# Patient Record
Sex: FEMALE | Race: WHITE | Employment: OTHER | URBAN - METROPOLITAN AREA
[De-identification: names, ages, dates, MRNs, and addresses within clinical notes are randomized per-mention and may not be internally consistent; named-entity substitution may affect disease eponyms.]

---

## 2017-04-30 ENCOUNTER — COMMUNICATION - HEALTHEAST (OUTPATIENT)
Dept: FAMILY MEDICINE | Facility: CLINIC | Age: 32
End: 2017-04-30

## 2017-04-30 DIAGNOSIS — Z30.09 BIRTH CONTROL COUNSELING: ICD-10-CM

## 2019-11-21 ENCOUNTER — RECORDS - HEALTHEAST (OUTPATIENT)
Dept: LAB | Facility: CLINIC | Age: 34
End: 2019-11-21

## 2019-11-21 LAB
ALBUMIN UR-MCNC: NEGATIVE MG/DL
APPEARANCE UR: ABNORMAL
BACTERIA #/AREA URNS HPF: ABNORMAL HPF
BASOPHILS # BLD AUTO: 0 THOU/UL (ref 0–0.2)
BASOPHILS NFR BLD AUTO: 1 % (ref 0–2)
BILIRUB UR QL STRIP: NEGATIVE
COLOR UR AUTO: YELLOW
EOSINOPHIL # BLD AUTO: 0 THOU/UL (ref 0–0.4)
EOSINOPHIL NFR BLD AUTO: 1 % (ref 0–6)
ERYTHROCYTE [DISTWIDTH] IN BLOOD BY AUTOMATED COUNT: 12.7 % (ref 11–14.5)
GLUCOSE UR STRIP-MCNC: NEGATIVE MG/DL
HCT VFR BLD AUTO: 38.9 % (ref 35–47)
HGB BLD-MCNC: 13.1 G/DL (ref 12–16)
HGB UR QL STRIP: NEGATIVE
HIV 1+2 AB+HIV1 P24 AG SERPL QL IA: NEGATIVE
KETONES UR STRIP-MCNC: NEGATIVE MG/DL
LEUKOCYTE ESTERASE UR QL STRIP: NEGATIVE
LYMPHOCYTES # BLD AUTO: 1.7 THOU/UL (ref 0.8–4.4)
LYMPHOCYTES NFR BLD AUTO: 26 % (ref 20–40)
MCH RBC QN AUTO: 30.1 PG (ref 27–34)
MCHC RBC AUTO-ENTMCNC: 33.7 G/DL (ref 32–36)
MCV RBC AUTO: 89 FL (ref 80–100)
MONOCYTES # BLD AUTO: 0.4 THOU/UL (ref 0–0.9)
MONOCYTES NFR BLD AUTO: 6 % (ref 2–10)
MUCOUS THREADS #/AREA URNS LPF: ABNORMAL LPF
NEUTROPHILS # BLD AUTO: 4.3 THOU/UL (ref 2–7.7)
NEUTROPHILS NFR BLD AUTO: 66 % (ref 50–70)
NITRATE UR QL: NEGATIVE
PH UR STRIP: 6 [PH] (ref 4.5–8)
PLATELET # BLD AUTO: 233 THOU/UL (ref 140–440)
PMV BLD AUTO: 10.5 FL (ref 8.5–12.5)
RBC # BLD AUTO: 4.35 MILL/UL (ref 3.8–5.4)
RBC #/AREA URNS AUTO: ABNORMAL HPF
SP GR UR STRIP: 1.01 (ref 1–1.03)
SQUAMOUS #/AREA URNS AUTO: ABNORMAL LPF
TRANS CELLS #/AREA URNS HPF: ABNORMAL LPF
UROBILINOGEN UR STRIP-ACNC: ABNORMAL
WBC #/AREA URNS AUTO: ABNORMAL HPF
WBC: 6.5 THOU/UL (ref 4–11)

## 2019-11-22 LAB
25(OH)D3 SERPL-MCNC: 31.3 NG/ML (ref 30–80)
ABO/RH(D): NORMAL
ABORH REPEAT: NORMAL
ANTIBODY SCREEN: NEGATIVE
BACTERIA SPEC CULT: NO GROWTH
C TRACH DNA SPEC QL PROBE+SIG AMP: NEGATIVE
HBA1C MFR BLD: 5.5 % (ref 4.2–6.1)
HBV SURFACE AG SERPL QL IA: NEGATIVE
HCV AB SERPL QL IA: NEGATIVE
N GONORRHOEA DNA SPEC QL NAA+PROBE: NEGATIVE
RUBV IGG SERPL QL IA: POSITIVE
T PALLIDUM AB SER QL: NEGATIVE

## 2020-04-07 ENCOUNTER — RECORDS - HEALTHEAST (OUTPATIENT)
Dept: LAB | Facility: CLINIC | Age: 35
End: 2020-04-07

## 2020-04-07 LAB
BASOPHILS # BLD AUTO: 0 THOU/UL (ref 0–0.2)
BASOPHILS NFR BLD AUTO: 0 % (ref 0–2)
EOSINOPHIL # BLD AUTO: 0.1 THOU/UL (ref 0–0.4)
EOSINOPHIL NFR BLD AUTO: 1 % (ref 0–6)
ERYTHROCYTE [DISTWIDTH] IN BLOOD BY AUTOMATED COUNT: 13.2 % (ref 11–14.5)
FASTING STATUS PATIENT QL REPORTED: NORMAL
GLUCOSE 1H P 50 G GLC PO SERPL-MCNC: 80 MG/DL (ref 70–139)
HCT VFR BLD AUTO: 38.2 % (ref 35–47)
HGB BLD-MCNC: 12.5 G/DL (ref 12–16)
LYMPHOCYTES # BLD AUTO: 1.8 THOU/UL (ref 0.8–4.4)
LYMPHOCYTES NFR BLD AUTO: 19 % (ref 20–40)
MCH RBC QN AUTO: 30.8 PG (ref 27–34)
MCHC RBC AUTO-ENTMCNC: 32.7 G/DL (ref 32–36)
MCV RBC AUTO: 94 FL (ref 80–100)
MONOCYTES # BLD AUTO: 0.5 THOU/UL (ref 0–0.9)
MONOCYTES NFR BLD AUTO: 5 % (ref 2–10)
NEUTROPHILS # BLD AUTO: 7.3 THOU/UL (ref 2–7.7)
NEUTROPHILS NFR BLD AUTO: 75 % (ref 50–70)
PLATELET # BLD AUTO: 198 THOU/UL (ref 140–440)
PMV BLD AUTO: 11.3 FL (ref 8.5–12.5)
RBC # BLD AUTO: 4.06 MILL/UL (ref 3.8–5.4)
WBC: 9.8 THOU/UL (ref 4–11)

## 2020-04-08 LAB
ANTIBODY SCREEN: NEGATIVE
T PALLIDUM AB SER QL: NEGATIVE

## 2020-05-11 ENCOUNTER — HOSPITAL ENCOUNTER (INPATIENT)
Facility: CLINIC | Age: 35
LOS: 18 days | Discharge: LEFT BEFORE DISCHARGE | End: 2020-05-29
Attending: ADVANCED PRACTICE MIDWIFE | Admitting: OBSTETRICS & GYNECOLOGY
Payer: COMMERCIAL

## 2020-05-11 DIAGNOSIS — A60.00 HERPES SIMPLEX INFECTION OF GENITOURINARY SYSTEM: Primary | ICD-10-CM

## 2020-05-11 PROBLEM — Z36.89 ENCOUNTER FOR TRIAGE IN PREGNANT PATIENT: Status: ACTIVE | Noted: 2020-05-11

## 2020-05-11 PROBLEM — O42.919 PRETERM PREMATURE RUPTURE OF MEMBRANES (PPROM) WITH UNKNOWN ONSET OF LABOR: Status: ACTIVE | Noted: 2020-05-11

## 2020-05-11 LAB
ABO + RH BLD: NORMAL
ABO + RH BLD: NORMAL
ALBUMIN UR-MCNC: NEGATIVE MG/DL
AMPHETAMINES UR QL SCN: NEGATIVE
APPEARANCE UR: CLEAR
BASOPHILS # BLD AUTO: 0 10E9/L (ref 0–0.2)
BASOPHILS NFR BLD AUTO: 0.1 %
BILIRUB UR QL STRIP: NEGATIVE
BLD GP AB SCN SERPL QL: NORMAL
BLOOD BANK CMNT PATIENT-IMP: NORMAL
CANNABINOIDS UR QL: NEGATIVE
COCAINE UR QL: NEGATIVE
COLOR UR AUTO: ABNORMAL
DIFFERENTIAL METHOD BLD: NORMAL
EOSINOPHIL # BLD AUTO: 0 10E9/L (ref 0–0.7)
EOSINOPHIL NFR BLD AUTO: 0.3 %
ERYTHROCYTE [DISTWIDTH] IN BLOOD BY AUTOMATED COUNT: 12.8 % (ref 10–15)
GLUCOSE UR STRIP-MCNC: 300 MG/DL
HCT VFR BLD AUTO: 35.9 % (ref 35–47)
HGB BLD-MCNC: 12.2 G/DL (ref 11.7–15.7)
HGB UR QL STRIP: NEGATIVE
IMM GRANULOCYTES # BLD: 0 10E9/L (ref 0–0.4)
IMM GRANULOCYTES NFR BLD: 0.2 %
KETONES UR STRIP-MCNC: 10 MG/DL
LEUKOCYTE ESTERASE UR QL STRIP: NEGATIVE
LYMPHOCYTES # BLD AUTO: 1.8 10E9/L (ref 0.8–5.3)
LYMPHOCYTES NFR BLD AUTO: 17.1 %
MCH RBC QN AUTO: 30.9 PG (ref 26.5–33)
MCHC RBC AUTO-ENTMCNC: 34 G/DL (ref 31.5–36.5)
MCV RBC AUTO: 91 FL (ref 78–100)
MONOCYTES # BLD AUTO: 0.7 10E9/L (ref 0–1.3)
MONOCYTES NFR BLD AUTO: 6.4 %
NEUTROPHILS # BLD AUTO: 8 10E9/L (ref 1.6–8.3)
NEUTROPHILS NFR BLD AUTO: 75.9 %
NITRATE UR QL: NEGATIVE
NRBC # BLD AUTO: 0 10*3/UL
NRBC BLD AUTO-RTO: 0 /100
OPIATES UR QL SCN: NEGATIVE
PCP UR QL SCN: NEGATIVE
PH UR STRIP: 5.5 PH (ref 5–7)
PLATELET # BLD AUTO: 185 10E9/L (ref 150–450)
RBC # BLD AUTO: 3.95 10E12/L (ref 3.8–5.2)
RUPTURE OF FETAL MEMBRANES BY ROM PLUS: POSITIVE
SARS-COV-2 PCR COMMENT: NORMAL
SARS-COV-2 RNA SPEC QL NAA+PROBE: NEGATIVE
SARS-COV-2 RNA SPEC QL NAA+PROBE: NORMAL
SOURCE: ABNORMAL
SP GR UR STRIP: 1.01 (ref 1–1.03)
SPECIMEN EXP DATE BLD: NORMAL
SPECIMEN SOURCE: NORMAL
UROBILINOGEN UR STRIP-MCNC: NORMAL MG/DL (ref 0–2)
WBC # BLD AUTO: 10.5 10E9/L (ref 4–11)
WET PREP SPEC: NORMAL

## 2020-05-11 PROCEDURE — 59025 FETAL NON-STRESS TEST: CPT

## 2020-05-11 PROCEDURE — 86850 RBC ANTIBODY SCREEN: CPT | Performed by: STUDENT IN AN ORGANIZED HEALTH CARE EDUCATION/TRAINING PROGRAM

## 2020-05-11 PROCEDURE — 87086 URINE CULTURE/COLONY COUNT: CPT | Performed by: STUDENT IN AN ORGANIZED HEALTH CARE EDUCATION/TRAINING PROGRAM

## 2020-05-11 PROCEDURE — 86780 TREPONEMA PALLIDUM: CPT | Performed by: STUDENT IN AN ORGANIZED HEALTH CARE EDUCATION/TRAINING PROGRAM

## 2020-05-11 PROCEDURE — 25000132 ZZH RX MED GY IP 250 OP 250 PS 637: Performed by: STUDENT IN AN ORGANIZED HEALTH CARE EDUCATION/TRAINING PROGRAM

## 2020-05-11 PROCEDURE — 86901 BLOOD TYPING SEROLOGIC RH(D): CPT | Performed by: STUDENT IN AN ORGANIZED HEALTH CARE EDUCATION/TRAINING PROGRAM

## 2020-05-11 PROCEDURE — 12000001 ZZH R&B MED SURG/OB UMMC

## 2020-05-11 PROCEDURE — 86900 BLOOD TYPING SEROLOGIC ABO: CPT | Performed by: STUDENT IN AN ORGANIZED HEALTH CARE EDUCATION/TRAINING PROGRAM

## 2020-05-11 PROCEDURE — G0463 HOSPITAL OUTPT CLINIC VISIT: HCPCS | Mod: 25

## 2020-05-11 PROCEDURE — 87635 SARS-COV-2 COVID-19 AMP PRB: CPT | Performed by: STUDENT IN AN ORGANIZED HEALTH CARE EDUCATION/TRAINING PROGRAM

## 2020-05-11 PROCEDURE — 80307 DRUG TEST PRSMV CHEM ANLYZR: CPT | Performed by: STUDENT IN AN ORGANIZED HEALTH CARE EDUCATION/TRAINING PROGRAM

## 2020-05-11 PROCEDURE — 99207 ZZC CONSULT E&M CHANGED TO SUBSEQUENT LEVEL: CPT | Performed by: NURSE PRACTITIONER

## 2020-05-11 PROCEDURE — 87491 CHLMYD TRACH DNA AMP PROBE: CPT | Performed by: STUDENT IN AN ORGANIZED HEALTH CARE EDUCATION/TRAINING PROGRAM

## 2020-05-11 PROCEDURE — 87210 SMEAR WET MOUNT SALINE/INK: CPT | Performed by: STUDENT IN AN ORGANIZED HEALTH CARE EDUCATION/TRAINING PROGRAM

## 2020-05-11 PROCEDURE — 84112 EVAL AMNIOTIC FLUID PROTEIN: CPT | Performed by: ADVANCED PRACTICE MIDWIFE

## 2020-05-11 PROCEDURE — 85025 COMPLETE CBC W/AUTO DIFF WBC: CPT | Performed by: STUDENT IN AN ORGANIZED HEALTH CARE EDUCATION/TRAINING PROGRAM

## 2020-05-11 PROCEDURE — 99231 SBSQ HOSP IP/OBS SF/LOW 25: CPT | Performed by: NURSE PRACTITIONER

## 2020-05-11 PROCEDURE — 81003 URINALYSIS AUTO W/O SCOPE: CPT | Performed by: STUDENT IN AN ORGANIZED HEALTH CARE EDUCATION/TRAINING PROGRAM

## 2020-05-11 PROCEDURE — 25000128 H RX IP 250 OP 636: Performed by: STUDENT IN AN ORGANIZED HEALTH CARE EDUCATION/TRAINING PROGRAM

## 2020-05-11 PROCEDURE — 87653 STREP B DNA AMP PROBE: CPT | Performed by: STUDENT IN AN ORGANIZED HEALTH CARE EDUCATION/TRAINING PROGRAM

## 2020-05-11 PROCEDURE — 87591 N.GONORRHOEAE DNA AMP PROB: CPT | Performed by: STUDENT IN AN ORGANIZED HEALTH CARE EDUCATION/TRAINING PROGRAM

## 2020-05-11 RX ORDER — AMOXICILLIN 250 MG
2 CAPSULE ORAL 2 TIMES DAILY
Status: DISCONTINUED | OUTPATIENT
Start: 2020-05-11 | End: 2020-05-29 | Stop reason: HOSPADM

## 2020-05-11 RX ORDER — ACYCLOVIR 200 MG/1
400 CAPSULE ORAL 3 TIMES DAILY
Status: DISCONTINUED | OUTPATIENT
Start: 2020-05-11 | End: 2020-05-29 | Stop reason: HOSPADM

## 2020-05-11 RX ORDER — BETAMETHASONE SODIUM PHOSPHATE AND BETAMETHASONE ACETATE 3; 3 MG/ML; MG/ML
12 INJECTION, SUSPENSION INTRA-ARTICULAR; INTRALESIONAL; INTRAMUSCULAR; SOFT TISSUE EVERY 24 HOURS
Status: COMPLETED | OUTPATIENT
Start: 2020-05-11 | End: 2020-05-12

## 2020-05-11 RX ORDER — AMOXICILLIN 250 MG/1
250 CAPSULE ORAL 3 TIMES DAILY
Status: COMPLETED | OUTPATIENT
Start: 2020-05-13 | End: 2020-05-18

## 2020-05-11 RX ORDER — PRENATAL VIT/IRON FUM/FOLIC AC 27MG-0.8MG
1 TABLET ORAL DAILY
COMMUNITY

## 2020-05-11 RX ORDER — ONDANSETRON 2 MG/ML
4 INJECTION INTRAMUSCULAR; INTRAVENOUS EVERY 6 HOURS PRN
Status: DISCONTINUED | OUTPATIENT
Start: 2020-05-11 | End: 2020-05-29 | Stop reason: HOSPADM

## 2020-05-11 RX ORDER — AMPICILLIN 2 G/1
2 INJECTION, POWDER, FOR SOLUTION INTRAVENOUS EVERY 6 HOURS
Status: COMPLETED | OUTPATIENT
Start: 2020-05-11 | End: 2020-05-13

## 2020-05-11 RX ORDER — ACETAMINOPHEN 325 MG/1
650 TABLET ORAL EVERY 6 HOURS PRN
Status: DISCONTINUED | OUTPATIENT
Start: 2020-05-11 | End: 2020-05-29 | Stop reason: HOSPADM

## 2020-05-11 RX ORDER — LIDOCAINE 40 MG/G
CREAM TOPICAL
Status: DISCONTINUED | OUTPATIENT
Start: 2020-05-11 | End: 2020-05-29 | Stop reason: HOSPADM

## 2020-05-11 RX ORDER — AZITHROMYCIN 500 MG/1
1000 TABLET, FILM COATED ORAL ONCE
Status: COMPLETED | OUTPATIENT
Start: 2020-05-11 | End: 2020-05-11

## 2020-05-11 RX ORDER — AMOXICILLIN 250 MG
1 CAPSULE ORAL 2 TIMES DAILY
Status: DISCONTINUED | OUTPATIENT
Start: 2020-05-11 | End: 2020-05-29 | Stop reason: HOSPADM

## 2020-05-11 RX ADMIN — AZITHROMYCIN MONOHYDRATE 1000 MG: 500 TABLET ORAL at 19:57

## 2020-05-11 RX ADMIN — BETAMETHASONE SODIUM PHOSPHATE AND BETAMETHASONE ACETATE 12 MG: 3; 3 INJECTION, SUSPENSION INTRA-ARTICULAR; INTRALESIONAL; INTRAMUSCULAR at 19:57

## 2020-05-11 RX ADMIN — ACYCLOVIR 400 MG: 200 CAPSULE ORAL at 21:22

## 2020-05-11 RX ADMIN — AMPICILLIN SODIUM 2 G: 2 INJECTION, POWDER, FOR SOLUTION INTRAVENOUS at 20:24

## 2020-05-11 ASSESSMENT — ACTIVITIES OF DAILY LIVING (ADL)
RETIRED_COMMUNICATION: 0-->UNDERSTANDS/COMMUNICATES WITHOUT DIFFICULTY
FALL_HISTORY_WITHIN_LAST_SIX_MONTHS: NO
DRESS: 0-->INDEPENDENT
TOILETING: 0-->INDEPENDENT
RETIRED_EATING: 0-->INDEPENDENT
COGNITION: 0 - NO COGNITION ISSUES REPORTED
TRANSFERRING: 0-->INDEPENDENT
AMBULATION: 0-->INDEPENDENT
SWALLOWING: 0-->SWALLOWS FOODS/LIQUIDS WITHOUT DIFFICULTY
BATHING: 0-->INDEPENDENT

## 2020-05-11 ASSESSMENT — MIFFLIN-ST. JEOR: SCORE: 1467.92

## 2020-05-11 NOTE — PROGRESS NOTES
"HOSPITAL TRIAGE NOTE  ===================    CHIEF COMPLAINT  ========================  Nancy Shoemaker is a 35 year old patient presenting today at 33w5d for evaluation of leaking vaginal fluid since midnight last night.  She reports active fetus, no vaginal bleeding and no contractions. She had a +nitrizine test at the office of her homebirth midwife, Gee Garcia.       EXAM  ============  /76   Pulse 108   Temp 98.6  F (37  C) (Oral)   Resp 20   Ht 1.753 m (5' 9\")   Wt 69.9 kg (154 lb)   BMI 22.74 kg/m       Labs: +ROM Plus.     A/P: PPROM without labor of  at 33w5d.    Report given to OB MD team and BRANDON for antepartum pt admission with PPROM.     Julee Tran CNM  "

## 2020-05-12 ENCOUNTER — HOSPITAL ENCOUNTER (INPATIENT)
Dept: ULTRASOUND IMAGING | Facility: CLINIC | Age: 35
End: 2020-05-12
Attending: OBSTETRICS & GYNECOLOGY
Payer: COMMERCIAL

## 2020-05-12 LAB
BACTERIA SPEC CULT: NORMAL
C TRACH DNA SPEC QL NAA+PROBE: NEGATIVE
GP B STREP DNA SPEC QL NAA+PROBE: NEGATIVE
Lab: NORMAL
N GONORRHOEA DNA SPEC QL NAA+PROBE: NEGATIVE
SPECIMEN SOURCE: NORMAL
T PALLIDUM AB SER QL: NONREACTIVE

## 2020-05-12 PROCEDURE — 25000132 ZZH RX MED GY IP 250 OP 250 PS 637: Performed by: STUDENT IN AN ORGANIZED HEALTH CARE EDUCATION/TRAINING PROGRAM

## 2020-05-12 PROCEDURE — 76819 FETAL BIOPHYS PROFIL W/O NST: CPT | Performed by: OBSTETRICS & GYNECOLOGY

## 2020-05-12 PROCEDURE — 25800030 ZZH RX IP 258 OP 636: Performed by: STUDENT IN AN ORGANIZED HEALTH CARE EDUCATION/TRAINING PROGRAM

## 2020-05-12 PROCEDURE — 25000128 H RX IP 250 OP 636: Performed by: STUDENT IN AN ORGANIZED HEALTH CARE EDUCATION/TRAINING PROGRAM

## 2020-05-12 PROCEDURE — 76811 OB US DETAILED SNGL FETUS: CPT

## 2020-05-12 PROCEDURE — 12000001 ZZH R&B MED SURG/OB UMMC

## 2020-05-12 RX ORDER — DOCUSATE SODIUM 100 MG/1
100 CAPSULE, LIQUID FILLED ORAL 2 TIMES DAILY PRN
Status: DISCONTINUED | OUTPATIENT
Start: 2020-05-12 | End: 2020-05-29 | Stop reason: HOSPADM

## 2020-05-12 RX ORDER — PRENATAL VIT/IRON FUM/FOLIC AC 27MG-0.8MG
1 TABLET ORAL DAILY
Status: DISCONTINUED | OUTPATIENT
Start: 2020-05-12 | End: 2020-05-29 | Stop reason: HOSPADM

## 2020-05-12 RX ADMIN — AMPICILLIN SODIUM 2 G: 2 INJECTION, POWDER, FOR SOLUTION INTRAVENOUS at 01:55

## 2020-05-12 RX ADMIN — SODIUM CHLORIDE, POTASSIUM CHLORIDE, SODIUM LACTATE AND CALCIUM CHLORIDE: 600; 310; 30; 20 INJECTION, SOLUTION INTRAVENOUS at 16:30

## 2020-05-12 RX ADMIN — ACYCLOVIR 400 MG: 200 CAPSULE ORAL at 20:00

## 2020-05-12 RX ADMIN — AMPICILLIN SODIUM 2 G: 2 INJECTION, POWDER, FOR SOLUTION INTRAVENOUS at 08:31

## 2020-05-12 RX ADMIN — AMPICILLIN SODIUM 2 G: 2 INJECTION, POWDER, FOR SOLUTION INTRAVENOUS at 14:14

## 2020-05-12 RX ADMIN — AMPICILLIN SODIUM 2 G: 2 INJECTION, POWDER, FOR SOLUTION INTRAVENOUS at 20:00

## 2020-05-12 RX ADMIN — ACYCLOVIR 400 MG: 200 CAPSULE ORAL at 08:32

## 2020-05-12 RX ADMIN — BETAMETHASONE SODIUM PHOSPHATE AND BETAMETHASONE ACETATE 12 MG: 3; 3 INJECTION, SUSPENSION INTRA-ARTICULAR; INTRALESIONAL; INTRAMUSCULAR at 20:01

## 2020-05-12 RX ADMIN — PRENATAL VITAMINS-IRON FUMARATE 27 MG IRON-FOLIC ACID 0.8 MG TABLET 1 TABLET: at 14:14

## 2020-05-12 RX ADMIN — ACYCLOVIR 400 MG: 200 CAPSULE ORAL at 14:33

## 2020-05-12 ASSESSMENT — MIFFLIN-ST. JEOR: SCORE: 1462.48

## 2020-05-12 NOTE — PROGRESS NOTES
MFM Antepartum Progress Note      Subjective: Feeling well today. Good fetal movement. Denies any further leaking. Denies vaginal bleeding, contractions. She does report some Alejandro-Campos that feel similar to what she has been experiencing for weeks. She enjoyed her US today and getting a look at her baby to make sure they are doing well. She has many questions regarding time of delivery. She desires to stay pregnant longer than 34 weeks as long as it would be appropriate for herself and baby.      Objective:  Vitals:    20 0154 20 0630 20 0825 20 0937   BP: 100/61   112/69   Pulse:       Resp: 18   17   Temp: 97.8  F (36.6  C) 98  F (36.7  C)  97.9  F (36.6  C)   TempSrc: Oral Oral  Oral   Weight:   69.3 kg (152 lb 12.8 oz)    Height:         Gen: Resting comfortably in bed, NAD  CV: RRR, no murmurs  Resp: CTAB, no wheezes, no crackles  Abd: Gravid, non-tender, non-distended  Ext: non-tender, no edema      FHT: , mod variability, accels, no decels  [place]: single ctx    Assessment: Nancy Shoemaker is a 35 year old  @ 33w6d by LMP c/w 13w1d US, admitted for PPROM at 33w5d. Currently on latency antibiotics without sings of infection, abruption,  labor.     # PPROM  - Currently on D#1 latency antibiotics.   - UA negative, wet prep w/ rare PMNs, UDS neg, GC/CT neg.. Await UCx  - S/p BMZ #1, second dose ordered  - S/p NICU consult  - Discussed risks of PPROM including  birth, placental abruption, infection. Management for PPROM has traditionally been expectant management and induction of labor at 34 weeks. There have been multiple RCTs in the [place] with increasing evidence for expectant management until 37 weeks. She is currently a good candidate for this given that her PPROM was so near to 34 weeks. Discussed with expectant management until 37 weeks she would be at increased risk for postpartum hemorrhage and Triple I/chorioamnionitis but that studies did not have increased  risk of  sepsis and had decreased length of NICU stay. Patient desires further expectant management until 37 weeks. Discussed that delivery may be indicated before this time if signs of PTL, abruption, fetal distress, or infection occur. Patient understands this.     # Rh negative  - Patient declined Rhogam. Will accept Rhogam PP if baby is Rh positive  - Myesha screen negative    # FWB  - Category I  - Growth US 47th percentile , BPP   - Twice weekly BPPs, next Friday 5/15    # PNC  - Rh negative, refused Rhogam.   - Allergy to Tdap.   - GCT 80, RPR NR, Rubella immune, A1c 5.5%, HBsAg negative, HIV neg, HCV neg.  - GBS pending    # Delivery planning  - Delivery with Midwives if vaginal delivery > 34 weeks. Will discuss.   - Cephalic on BPP today. Will recheck presentation before delivery  - Reviewed indications for a CS including abruption, fetal distress, malpresentation in labor. Discussed risks including bleeding, infection, damage to surrounding organs. Patient agrees to blood transfusion if necessary. Consent signed.       Patient seen with Dr. Tulio Jim MD  Obstetrics & Gynecology, PGY-2  2020 1:16 PM      FACULTY NOTE:  See H&P for details.     Mony Antunez DO FACOG  Maternal Fetal Medicine Specialist  Pager: [phone]  Mobile: [phone]

## 2020-05-12 NOTE — DISCHARGE SUMMARY
[facility] Discharge Summary    Nancy Shoemaker MRN# [id number]   Age: 35 year old YOB: 1985     Date of Admission:  2020  Date of Discharge:  2020  Admitting Physician:  Mony Antunez, DO  Discharge Physician:  Norris Willis MD    Admit Dx:   -  at 33w5d  - PPROM  - Rh negative  - History of genital HSV    Discharge Dx:  -  at 36w2d, discharging against medical advice  - PPROM  - Rh negative  - History of genital HSV    Admit HPI:  Nancy Shoemaker is a 35 year old  at 33w5d by 13w1d US who presents with leaking fluid. She reports noticing increased white mucoid discharge yesterday evening, followed by watery discharge starting around midnight. She first noticed the leaking while lying in bed, notes that the fluid was clear. She presented to her midwife today for this complaint and the fluid on her pad was nitrazine positive. She then presented to L&D triage, where she was noted by the CNM to have a positive ROM+ test. She denies contractions or vaginal bleeding. Reports normal fetal movement.     She has received prenatal care this pregnancy at [facility].    Please see her admit H&P for further details.    Antepartum Course:  An infectious workup was collected in triage, including GC/CT, UA, wet prep, UDS, and GBS. The patient was found to have pooling clear fluid on sterile speculum exam and based on this, in conjunction with her positive ROM+ and nitrazine tests, was diagnosed with PPROM. She was admitted to the antepartum unit where she received betamethasone for fetal lung maturity and latency antibiotics. She was started on acyclovir prophylaxis given her history of genital HSV. Fetal heart tracing remained reactive and reassuring throughout her admission. Twice weekly BPP were performed, which were all 88.    As she approached recommended gestational age for induction (36w6d), she desired discharge to home for continued  expectant management. Multiple discussions were conducted regarding the risks of discharge from the hospital as well as continued expectant management. A thorough discussion regarding the risks of triple I, cord prolapse, abruption, maternal sepsis,  sepsis,  and  death, and need for hysterectomy if she were to discharge home without undergoing planned IOL and were to develop an infection at home. She acceps these risks and desired discharge to home against medical advice at 36w2d with plan to return to [facility] with spontaneous labor or any subjective signs of infection. She will follow up with the midwives 1-2x per week for maternal and fetal monitoring until delivery.    Discharge Medications:     Review of your medicines      START taking      Dose / Directions   acyclovir 200 MG capsule  Commonly known as:  ZOVIRAX  Indication:  Prophylaxis of Herpes Simplex      Dose:  400 mg  Take 2 capsules (400 mg) by mouth 3 times daily  Quantity:  72 capsule  Refills:  0     breast pump Misc  Used for:  Disorder of lactation      Dose:  1 each  1 each as needed (lactation)  Quantity:  1 each  Refills:  0        CONTINUE these medicines which have NOT CHANGED      Dose / Directions   prenatal multivitamin w/iron 27-0.8 MG tablet      Dose:  1 tablet  Take 1 tablet by mouth daily  Refills:  0           Where to get your medicines      These medications were sent to [facility] [place], [place] - [address]  [address] [address]    Phone:  [phone]     acyclovir 200 MG capsule     Some of these will need a paper prescription and others can be bought over the counter. Ask your nurse if you have questions.    Bring a paper prescription for each of these medications    breast pump Misc         Discharge/Disposition:  Nancy Shoemaker was discharged to home in stable condition with the following instructions/medications:  1) Call for temperature > 100.4, foul  smelling vaginal discharge, increasing pain or contractions, decreased fetal movement.  2) She will follow up with CNM service 1-2 per week for maternal vital signs, fetal heart rate monitoring.  3) She signed the discharge AMA form prior to leaving.    Patient seen by Dr. Willis on day of discharge    Hal Andrew MD, MPH  OBGYN PGY-2  5/29/2020 9:18 AM     Physician Attestation   I, Norris Willis MD, saw this patient with the resident/fellow and agree with the resident s findings and plan of care as documented in the resident s note.      I personally reviewed vital signs, medications, labs and imaging.    Key findings: PROM at 36 weeks and 2 days. Planning to leave AMA and return for labor, infection or if she decides to allow for IOL.    Norris Willis  Date of Service (when I saw the patient): 05/29/20    Time Spent on this Encounter   I, Norris Willis, spent a total of 15 minutes bedside and on the inpatient unit today managing the care of Nancy Shoemaker.  Over 50% of my time on the unit was spent counseling the patient and /or coordinating care regarding management of PROM and risks of expectant management at term. See note for details.

## 2020-05-12 NOTE — CONSULTS
_       [facility]                      Neonatology Advanced Practice Antepartum Counseling Consult    I was asked to provide antepartum counseling for Nancy Shoemaker at the request of Mony Antunez, secondary to PPROM. Ms. Shoemaker is currently 33w5d and has a hx significant for ROM. Betamethasone will be administered on official admission. Ms. Shoemaker, accompanied by her spouse, was counseled on the expected hospital course, potential risks, and outcomes associated with an infant born at approximately 34 weeks gestation. The counseling included: initial delivery room stabilization, respiratory course, lung development,  nutrition, growth and development. Please feel free to call with any additional questions or concerns.      CATHERINE Mcneal   Advanced Practice Service    Intensive Care Unit  [facility]      Floor Time (min): 5  Face to Face Time (min): 15  Total Time (minutes): 20  More than 50% of my time was spent in direct, face to face, antepartum counseling with the above patient.    CATHERINE Mcneal, NN, 2020 7:25 PM  [facility]

## 2020-05-12 NOTE — PLAN OF CARE
Patient VSS and afebrile. Patient denies any S/S of PTL, pain and change in condition. Plan to proceed with expectant monitoring until 36 weeks agreed upon today with pt and team. , ctx x2 per toco, see flowsheet for more detail. Pt notified when to call out to RN, verbalized understanding and agreed to plan. Will proceed with ongoing assessment.

## 2020-05-12 NOTE — PLAN OF CARE
Afebrile. Leaking scant  amounts of clear fluid. Contraction pattern  none . Fetal assessment Reactive. No signs and symptoms of infection. Monitor vital signs and indicators of infection every 4 hours while awake. SCDs on, saline locked flushed. !st beta given today at 1957. NICU consult done. Covid negative.

## 2020-05-12 NOTE — PLAN OF CARE
"VSS, afebrile. Pt resting intermittently overnight. Denies bleeding. States that she continues to leak a small amount of clear fluid and feeling \"Alejandro-Campos\" contractions that are not painful. Per toco, 3 contractions noted during monitoring. See flowsheets for uterine activity and FHR records. Educated about when to seek medical attention. Will continue with IV antibiotic treatment for PPROM and continue to monitor for signs and symptoms of infection. Pt desires to hold off on induction for as long as possible, desires unmedicated, vaginal delivery. Will continue to monitor per protocol.   "

## 2020-05-12 NOTE — CONSULTS
SOCIAL WORK PROGRESS NOTE    DATA:     Coverage SW received a consult request from the Antepartum Unit. Nancy Shoemaker, age 35, has an anticipated NICU admission at ~34w due to PPROM.    INTERVENTION:   1. Provide ongoing assessment of patient and family's level of coping.   2. Provide psychosocial supportive counseling and crisis intervention as needed.   3. Facilitate service linkage with hospital and community resources as needed.   4. Collaborate with healthcare team to meet patient and family's needs.   ASSESSMENT:     Writer called and spoke with Nancy to introduce the maternal child health social work role. Nancy appeared receptive to SW intervention, however declined having any immediate needs.     Nancy reported she is feeling well and is effectively adjusting to her new birth plan. While Nancy originally wanted to deliver at home, she understands the necessity of her potential admission. Nancy stated the medical team has made her feel comfortable and safe with the plan in place. Nancy described herself as an organized individual stating she already had a hospital bag prepared prior to this. Dependent on how long she ll need to stay in the hospital, she may need family to bring additional toiletries.     Nancy reported she has a robust support network. Nancy identified one potential area of distress will be missing her son. She has one other child at home, Levi (27 months). For now, Nancy endorsed a sense of content.     PLAN:     Writer notified Nancy the maternal child health SW team would continue following throughout her hospitalization. Writer offered direct contact information, but Nancy declined stating she d ask her nurse if this support is needed.     UZIEL Bonilla, [facility]   Outpatient Specialty Clinics-    [email or web address]   Office: [phone]  Pager: [phone]      *No Letter

## 2020-05-12 NOTE — H&P
[facility]  OB History and Physical      Nancy Shoemaker MRN# [id number]   Age: 35 year old YOB: 1985     CC:  Leaking of fluid    HPI:  Nancy Shoemaker is a 35 year old  at 33w5d by 13w1d US who presents with leaking fluid. She reports noticing increased white mucoid discharge yesterday evening, followed by watery discharge starting around midnight. She first noticed the leaking while lying in bed, notes that the fluid was clear. She presented to her midwife today for this complaint and the fluid on her pad was nitrazine positive. She then presented to L&D triage, where she was noted by the CNM to have a positive ROM+ test. She denies contractions or vaginal bleeding. Reports normal fetal movement.    She has received prenatal care this pregnancy at [facility].    Pregnancy Complications:  1. Rh negative, declined Rhogam in pregnancy  2. Tdap allergy  3. Possible low-lying placenta  4. History of genital HSV    Prenatal Labs:   Blood type O-  GCT 80  RPR NR  Rubella immune  Hgb 12.5, Plt 198 (20)  UCx no growth (19)  Hgb A1c 5.5%  GC/CT negative  HBsAg negative  HIV negative  HCV negative    GBS Status:   Unknown    US 2020:  - Anterior placenta, edge 2.5cm from internal os    OB History  OB History    Para Term  AB Living   2 1 1 0 0 1   SAB TAB Ectopic Multiple Live Births   0 0 0 0 1      # Outcome Date GA Lbr Abe/2nd Weight Sex Delivery Anes PTL Lv   2 Current            1 Term 18    M Vag-Spont None N CRISTEL       PMHx:   Past Medical History:   Diagnosis Date     Genital HSV 2010        PSHx:   Past Surgical History:   Procedure Laterality Date     C EACH ADD TOOTH EXTRACTION          Meds:   PNV    Allergies:    Allergies   Allergen Reactions     Tetanus Toxoid       FmHx: No family history of bleeding or clotting disorders, birth defects, or problems with pregnancy or delivery.    SocHx:  She denies any tobacco, alcohol, or other  "drug use during this pregnancy.    ROS:   Complete 10-point ROS negative except as noted in HPI. She denies headache, blurry vision, chest pain, shortness of breath, RUQ pain, nausea, vomiting, dysuria, hematuria or extremity edema.    PE:  Vit:   Patient Vitals for the past 4 hrs:   BP Temp Temp src Pulse Resp Height Weight   20 1800 111/76 98.6  F (37  C) Oral 108 20 1.753 m (5' 9\") 69.9 kg (154 lb)      Gen: Well-appearing, NAD, comfortable  CV: RRR, no mrg  Pulm: CTAB, no wheezes or crackles  Abd: Soft, gravid, non-tender  Ext: Trace LE edema b/l  SSE: Normal external genital architecture, no skin lesions on vulvar and perianal exam. Vagina with moist pink rugae, pool of clear fluid in posterior fornix. Cervix visually closed, no lesions or erythema. No blood in vault.    Pres:  Cephalic by BSUS  EFW:  4lb by Leopold's  Memb: Ruptured with clear fluid              FHT: Baseline 145, moderate variability, accelerations present, no decelerations   [place]: 0 contractions in 10 minutes      Assessment:  Nancy Shoemaker is a 35 year old , at 33w5d by 13w1d US, who presents with PPROM.    Plan  Admit to antepartum unit.    PPROM:  - Discussed diagnosis and recommendations for antepartum admission, steroids for fetal lung maturity, and latency antibiotics. Also discussed recommendation for IOL at 34w to balance benefit of fetal well-being from continuing gestation with risk of infection from ongoing PPROM. Patient amenable, though does note desire to continue pregnancy as long as possible if clinically stable. Will continue to readdress throughout admission  - Betamethasone q24h x2 for fetal lung maturity  - Azithromycin/ampicillin for latency antibiotics  - GC/CT, wet prep, UA/UCx, UDS collected  - NICU consult to discuss anticipated  care  - SW consult given anticipated NICU admission  - Regular diet until IOL  - Plan for IOL at 34w0d, 24h after completion of betamethasone, pending clinical course. " Patient desires unmedicated birth, briefly discussed IOL process and available pain control options    FWB:  - Category I FHT, reactive and reassuring  - Continue TID monitoring  - GBS collected, anticipate PCN in labor unless culture returns negative    PNC:  - Rh negative (see below), Rubella immune, GBS pending, GCT passed (80), Placenta anterior. Not low-lying per last US    Rh negative:  - Declined Rhogam in pregnancy as this is intended to be her last pregnancy; however notes that she will accept Rhogam postpartum if baby is Rh positive    Genital HSV:  - BLE negative, will discuss prophylaxis with patient and plan for acyclovir until delivery    Tdap allergy:  - Will not give vaccine postpartum    The patient was discussed with Dr. Antunez who is in agreement with the treatment plan.    Traci Maynard MD  Ob/Gyn Resident, PGY-2  20 7:16 PM     Physician Attestation   I, Mony Antunez, DO, saw and evaluated Nancy Shoemaker with the resident.     I personally reviewed the vital signs, medications, labs and imaging.    My key history or physical exam findings:   Patient was transferred from [facility] service due to  PROM.  She was planning on delivering at a birth center.  History and Physical exam consistent with PPROM. Cephalic, cervix visually closed.  Patient did not have leaking overnight.  Denies contractions. +FM.  No fever/chills.  Denies bleeding.     Key management decisions made by me:   Discussed the typical management of PPROM which includes course of BMZ and course of latency Abx to prolong latency, decrease risk for other prematurity complications.  Patient has done her own research on continuing expectant management beyond 34 weeks and patient would like to consider this option.     Reviewed the data including no difference in rates of  sepsis, but decreased RDS, decreased NICU stay and decreased c/s rate with expectant management.  Maternal risks of expectant management  beyond 34 weeks include almost 2 fold increase risk in clinical chorioamnionitis and hemorrhage.     At this time the patient meets criteria for expectant management and still may meet criteria beyond 34 weeks.  Patient understands that delivery would be indicated with chorio, non reassuring fetal status, abruption, labor.  Reviewed that the average latency in the RCTs between expectant management and immediate delivery was approx 3-4 days.  Patient is at highest risk for requiring delivery within the first week of ROM.     Mony Antunez DO  Date of Service (when I saw the patient): 20    Time Spent on this Encounter   I, Mony Antunez DO, spent a total of 30 minutes face to face or coordinating care of Nancy Shoemaker.  Over 50% of my time on the unit was spent counseling the patient and/or coordinating care regarding  PROM.

## 2020-05-12 NOTE — PLAN OF CARE
Data: Patient presented to [facility] at 1718. Reason for maternal/fetal assessment per patient is Fluid Leak  Patient is a , Gestational Age 33w5d. VSS. Fetal movement present. Patient denies cramping, backache, pelvic pressure, UTI symptoms, GI problems, bloody show, vaginal bleeding, edema, headache, visual disturbances, epigastric or URQ pain, abdominal pain, Support persons  Jared present.  Action: Verbal consent for EFM. Triage assessment completed. EFM applied for monitoring. Uterine assessment no ctxs Fetal assessment: Presumed adequate fetal oxygenation documented (see flow record). ROM PLUS positive. WET prep, GClam urine collected and was sent.   Response: MOHAMUD Tran and Dr Maynard informed of pt arrival and status. Plan per provider is admission. Patient verbalized agreement with plan. Patient transferred to room 404 ambulatory, oriented to room and call light.

## 2020-05-12 NOTE — PROVIDER NOTIFICATION
05/12/20 1125   Provider Notification   Provider Name/Title GRACIE Antunez team   Method of Notification At Bedside   Request Evaluate in Person   Notification Reason Status Update   Providers present to evaluate in person. Pt. Requests discussion of IOL date later than 34 weeks gestation. Risks of delaying IOL discussed, benefits of expectant management discussed. Plan of day by day expectant management agreed upon. Pt aware that s/s of change in clinical assessment would necessitate immediate delivery. Will proceed with ongoing assessment.

## 2020-05-13 LAB — BLOOD BANK CMNT PATIENT-IMP: NORMAL

## 2020-05-13 PROCEDURE — 25000128 H RX IP 250 OP 636: Performed by: STUDENT IN AN ORGANIZED HEALTH CARE EDUCATION/TRAINING PROGRAM

## 2020-05-13 PROCEDURE — 12000001 ZZH R&B MED SURG/OB UMMC

## 2020-05-13 PROCEDURE — 85461 HEMOGLOBIN FETAL: CPT | Performed by: OBSTETRICS & GYNECOLOGY

## 2020-05-13 PROCEDURE — 36415 COLL VENOUS BLD VENIPUNCTURE: CPT | Performed by: OBSTETRICS & GYNECOLOGY

## 2020-05-13 PROCEDURE — 3E0234Z INTRODUCTION OF SERUM, TOXOID AND VACCINE INTO MUSCLE, PERCUTANEOUS APPROACH: ICD-10-PCS | Performed by: OBSTETRICS & GYNECOLOGY

## 2020-05-13 PROCEDURE — 25000132 ZZH RX MED GY IP 250 OP 250 PS 637: Performed by: STUDENT IN AN ORGANIZED HEALTH CARE EDUCATION/TRAINING PROGRAM

## 2020-05-13 PROCEDURE — 86901 BLOOD TYPING SEROLOGIC RH(D): CPT | Performed by: OBSTETRICS & GYNECOLOGY

## 2020-05-13 PROCEDURE — 86900 BLOOD TYPING SEROLOGIC ABO: CPT | Performed by: OBSTETRICS & GYNECOLOGY

## 2020-05-13 RX ADMIN — ACYCLOVIR 400 MG: 200 CAPSULE ORAL at 14:07

## 2020-05-13 RX ADMIN — ACYCLOVIR 400 MG: 200 CAPSULE ORAL at 19:57

## 2020-05-13 RX ADMIN — ACYCLOVIR 400 MG: 200 CAPSULE ORAL at 08:06

## 2020-05-13 RX ADMIN — AMOXICILLIN 250 MG: 250 CAPSULE ORAL at 20:00

## 2020-05-13 RX ADMIN — AMPICILLIN SODIUM 2 G: 2 INJECTION, POWDER, FOR SOLUTION INTRAVENOUS at 14:06

## 2020-05-13 RX ADMIN — PRENATAL VITAMINS-IRON FUMARATE 27 MG IRON-FOLIC ACID 0.8 MG TABLET 1 TABLET: at 08:03

## 2020-05-13 RX ADMIN — HUMAN RHO(D) IMMUNE GLOBULIN 300 MCG: 300 INJECTION, SOLUTION INTRAMUSCULAR at 14:08

## 2020-05-13 RX ADMIN — AMPICILLIN SODIUM 2 G: 2 INJECTION, POWDER, FOR SOLUTION INTRAVENOUS at 02:05

## 2020-05-13 RX ADMIN — AMPICILLIN SODIUM 2 G: 2 INJECTION, POWDER, FOR SOLUTION INTRAVENOUS at 08:02

## 2020-05-13 NOTE — PLAN OF CARE
VSS, afebrile. Pt resting intermittently throughout the night. Denies bleeding, reporting active fetal movement. States that she continues to leak a very small amount of clear fluid. Feeling some cramping described as Alejandro-Campos contractions, not uncomfortable. Per toco, one contraction noted. See flowsheets for uterine activity and FHR records. Will continue with antibiotic therapy for PPROM. Plan per providers and patient to delay induction and continue with day by day expectant management at this time. Pt agreeable to plan of care, will continue to monitor closely.

## 2020-05-13 NOTE — PLAN OF CARE
Afebrile. Leaking scant  amounts of clear fluid. Contraction pattern  none . Fetal assessment Reactive. No signs and symptoms of infection. Pt states is comfortable, denies feeling ctxs, or bleeding. Saline locked flushed. Rhogam IM given today (SEE MAR). Questions answered. Cares explained.

## 2020-05-13 NOTE — PLAN OF CARE
VSS. Afebrile. Pt reports scant leaking of clear fluid this evening. IV antibiotics continued. TOCO quiet, FHR normal baseline, moderate variability, +accelerations. See flowsheets for detail. Pt pleasant and keeping busy with her tablet and staying in contact with family. No questions/concerns at this time. Continue to follow current plan of care.

## 2020-05-13 NOTE — PROGRESS NOTES
MFM Antepartum Progress Note    Subjective: Patient feeling well.  Leaking small amount of clear fluid.  No bleeding.  No contractions.    Objective:  Vitals:    20 2015 20 2339 20 0200 20 0558   BP: 105/65 106/58  105/66   Pulse:       Resp: 16 16  16   Temp: 98.8  F (37.1  C) 98.1  F (36.7  C) 98.1  F (36.7  C) 98.2  F (36.8  C)   TempSrc: Oral Oral Oral Oral   Weight:       Height:         Gen: Resting comfortably in bed, NAD  Abd: Gravid, non-tender, non-distended  Ext: non-tender, no edema    FHT: , moderate variability, +accels, no decels  [place]: single ctx    Assessment: Nancy Shoemaker is a 35 year old  @ 34w0d by LMP c/w 13w1d US, admitted for PPROM at 33w5d. Currently on latency antibiotics without si/sx of infection, abruption,  labor.     # PPROM  - Currently on D#2 latency antibiotics.   - UA negative, wet prep w/ rare PMNs, UDS neg, GC/CT neg. Await UCx  - S/p BMZ #1, second dose ordered  - S/p NICU consult  - Discussed risks of PPROM including  birth, placental abruption, infection. Management for PPROM has traditionally been expectant management and induction of labor at 34 weeks. There have been multiple RCTs in with increasing evidence for expectant management until 37 weeks. She is currently a good candidate for this given that her PPROM was so near to 34 weeks. Discussed with expectant management until 36 weeks she would be at increased risk for postpartum hemorrhage and Triple I/chorioamnionitis but that studies did not have increased risk of  sepsis and had decreased length of NICU stay. Patient desires further expectant management until delivery indicated or 36 weeks. Discussed that delivery may be indicated before this time if signs of PTL, abruption, fetal distress, or infection occur. Patient understands this.     # Rh negative  - Patient now accepting of Rhogam, will give today.  - Myesha screen negative    # FWB  - Category I  - Growth US  47th percentile , BPP   - Twice weekly BPPs, next Friday 5/15    # PNC  - Rh negative, planning Rhogam (patient initially refused)  - Allergy to Tdap.   - GCT 80, RPR NR, Rubella immune, A1c 5.5%, HBsAg negative, HIV neg, HCV neg.  - GBS pending    # Delivery planning  - Delivery with Midwives if vaginal delivery > 34 weeks. Will discuss.   - Cephalic on BPP today. Will recheck presentation before delivery  - Reviewed indications for a CS including abruption, fetal distress, malpresentation in labor. Discussed risks including bleeding, infection, damage to surrounding organs. Patient agrees to blood transfusion if necessary. Consent signed .     Patient seen with Dr. Tulio Duarte MD  PGY-4 OB/GYN  [phone] (OB G3 Pager)  2020   9:30 AM     Physician Attestation   IMony DO, saw and evaluated Nancy Shoemaker with the resident.     I personally reviewed the vital signs, medications, labs and imaging.    My key history or physical exam findings:   Patient doing well.  +FM.  No bleeding. Denies contractions. No fever/chills.     Key management decisions made by me:   Continue expectant management.  Reviewed indications for delivery.  Patient desires  Rhogam now (had previously declined at 28 weeks).      Mony Antunez DO  Date of Service (when I saw the patient): 20    Time Spent on this Encounter   IMony DO, spent a total of 15 minutes face to face or coordinating care of Nancy Shoemaker.  Over 50% of my time on the unit was spent counseling the patient and/or coordinating care regarding PPROM.

## 2020-05-14 LAB
ABO + RH BLD: NORMAL
ABO + RH BLD: NORMAL
BLOOD BANK CMNT PATIENT-IMP: NORMAL
DATE RH IMM GL GVN: NORMAL
FETAL CELL SCN BLD QL ROSETTE: NORMAL
RH IG VIALS RECOM PATIENT: NORMAL

## 2020-05-14 PROCEDURE — 12000001 ZZH R&B MED SURG/OB UMMC

## 2020-05-14 PROCEDURE — 25000132 ZZH RX MED GY IP 250 OP 250 PS 637: Performed by: STUDENT IN AN ORGANIZED HEALTH CARE EDUCATION/TRAINING PROGRAM

## 2020-05-14 RX ADMIN — AMOXICILLIN 250 MG: 250 CAPSULE ORAL at 20:09

## 2020-05-14 RX ADMIN — PRENATAL VITAMINS-IRON FUMARATE 27 MG IRON-FOLIC ACID 0.8 MG TABLET 1 TABLET: at 07:43

## 2020-05-14 RX ADMIN — AMOXICILLIN 250 MG: 250 CAPSULE ORAL at 13:57

## 2020-05-14 RX ADMIN — ACYCLOVIR 400 MG: 200 CAPSULE ORAL at 20:09

## 2020-05-14 RX ADMIN — AMOXICILLIN 250 MG: 250 CAPSULE ORAL at 07:43

## 2020-05-14 RX ADMIN — ACYCLOVIR 400 MG: 200 CAPSULE ORAL at 07:43

## 2020-05-14 RX ADMIN — ACYCLOVIR 400 MG: 200 CAPSULE ORAL at 13:57

## 2020-05-14 NOTE — PROVIDER NOTIFICATION
05/14/20 0701   Provider Notification   Provider Name/Title Dr Duarte   Method of Notification Phone   Request Evaluate - Remote   Notification Reason Decels   will continue to monitor for 1 hour after decel.

## 2020-05-14 NOTE — PLAN OF CARE
Nancy has been afebrile. VSS. She leaks small amounts of clear fluid, denies contractions, uterine tenderness, or bleeding. Baby is active. EFM applied this afternoon which was extended due to no accelerations. Pt was repositioned, encouraged to orally hydrate, and given juice. Baby became active and accelerations present.

## 2020-05-14 NOTE — PROVIDER NOTIFICATION
Notified Dr. Hannon of [facility], baby with moderate variability but no accelerations for the hour. Plan is to continue to monitor.

## 2020-05-14 NOTE — PROVIDER NOTIFICATION
05/13/20 2137   Provider Notification   Provider Name/Title Dr. Luz   Method of Notification Electronic Page   Request Evaluate - Remote   Notification Reason Decels     Please review strip. 2123 another decel. Order bolus? Thanks!

## 2020-05-14 NOTE — PROGRESS NOTES
"M Antepartum Progress Note    Subjective: Patient feeling well.  Leaking small amount of clear fluid.  No bleeding.  No contractions. Denies fever, chills or vaginal bleeding.     Objective:  /68   Pulse 108   Temp 97.6  F (36.4  C) (Oral)   Resp 16   Ht 1.753 m (5' 9\")   Wt 69.3 kg (152 lb 12.8 oz)   BMI 22.56 kg/m      Gen: Resting comfortably in bed, NAD  Abd: Gravid, non-tender, non-distended  Ext: non-tender, no edema    FHT: , moderate variability, +accels, no decels  [place]: single ctx    Assessment: Nancy Shoemaker is a 35 year old  @ 34w1d by LMP c/w 13w1d US, HD#5 admitted for PPROM at 33w5d. Currently on latency antibiotics.    # PPROM  - Currently on D#4/7 latency antibiotics.   - UA negative, wet prep w/ rare PMNs, UDS neg, GC/CT neg. Await UCx  - S/p BMZ -  - S/p NICU consult  - Risks of expectant management of late  PPROM discussed. Patient was extensively counseled that PPROM has traditionally been expectant management and induction of labor at 34 weeks. There have been multiple RCTs in with increasing evidence for expectant management until 37 weeks. She is currently a good candidate for this given that her PPROM was so near to 34 weeks. Discussed with expectant management until 36 weeks she would be at increased risk for postpartum hemorrhage and Triple I/chorioamnionitis but that studies did not have increased risk of  sepsis and had decreased length of NICU stay. Patient desires further expectant management until delivery indicated or 36 weeks. Discussed that delivery may be indicated before this time if signs of PTL, abruption, fetal distress, or infection occur. Patient understands this.   -No evidence of signs or symptoms of infections.     # Rh negative  - RhIG given   - Myesha screen negative    # FWB  - Reassuring FHT  - Growth US 47th percentile , BPP   - Twice weekly BPPs, next Friday 5/15  (tomorrow)     # PNC  - Rh negative, planning " Rhogam (patient initially refused, given 5/13)   - Allergy to Tdap.   - GCT 80, RPR NR, Rubella immune, A1c 5.5%, HBsAg negative, HIV neg, HCV neg.  - GBS Negative 5/11    # Delivery planning  - Delivery with Midwives if vaginal delivery > 34 weeks.  - Cephalic on 5/13. Will recheck presentation before delivery  - Reviewed indications for a CS including abruption, fetal distress, malpresentation in labor. Discussed risks including bleeding, infection, damage to surrounding organs. Patient agrees to blood transfusion if necessary. Consent signed 5/2.     Patient seen with Dr. Tulio Restrepo MD  MFM Fellow   5/14/2020  1:30 PM      Physician Attestation   I, Mony Antunez DO, saw and evaluated Nancy Shoemaker with the resident.     I personally reviewed the vital signs, medications, labs and imaging.    My key history or physical exam findings:   Feels well. No fever/chills. Denies contractions or abd pain. +FM.     Key management decisions made by me: continue current care    Mony Antunez DO  Date of Service (when I saw the patient): 05/14/20    Time Spent on this Encounter   I, Mony Antunez DO, spent a total of 15 minutes face to face or coordinating care of Nancy Shoemaker.  Over 50% of my time on the unit was spent counseling the patient and/or coordinating care regarding PPROM with desire for expectant management.

## 2020-05-14 NOTE — PLAN OF CARE
VSS, afebrile. No cramping, back pain or pelvic pressure. Leaking scant amt clear fluid. Oral abx. Received Rhogam today. Occasional ctx. FHM moderate variability; +accels, decel x2. Notified MD (see note). Took off monitors this evening. Sleep has been difficult; hopes to sleep better tonight. Will continue to monitor.

## 2020-05-14 NOTE — PROVIDER NOTIFICATION
05/13/20 2140   Provider Notification   Provider Name/Title Dr. Duarte   Method of Notification Phone   Request Evaluate - Remote   Notification Reason Decels;Status Update     Pt has decel. On side. Paged update. Plan for oral hydration and continue to monitor for 30 minutes.

## 2020-05-14 NOTE — PLAN OF CARE
VSS, afebrile. Pt offered no complaints overnight. Reports being able to get some sleep. EFM see flowsheet. Continues to leak scant clear fluid. Continue with plan of care.

## 2020-05-15 ENCOUNTER — HOSPITAL ENCOUNTER (INPATIENT)
Dept: ULTRASOUND IMAGING | Facility: CLINIC | Age: 35
End: 2020-05-15
Payer: COMMERCIAL

## 2020-05-15 PROCEDURE — 25000132 ZZH RX MED GY IP 250 OP 250 PS 637: Performed by: STUDENT IN AN ORGANIZED HEALTH CARE EDUCATION/TRAINING PROGRAM

## 2020-05-15 PROCEDURE — 12000001 ZZH R&B MED SURG/OB UMMC

## 2020-05-15 PROCEDURE — 76819 FETAL BIOPHYS PROFIL W/O NST: CPT

## 2020-05-15 RX ADMIN — AMOXICILLIN 250 MG: 250 CAPSULE ORAL at 07:58

## 2020-05-15 RX ADMIN — AMOXICILLIN 250 MG: 250 CAPSULE ORAL at 19:53

## 2020-05-15 RX ADMIN — ACYCLOVIR 400 MG: 200 CAPSULE ORAL at 07:58

## 2020-05-15 RX ADMIN — AMOXICILLIN 250 MG: 250 CAPSULE ORAL at 13:47

## 2020-05-15 RX ADMIN — PRENATAL VITAMINS-IRON FUMARATE 27 MG IRON-FOLIC ACID 0.8 MG TABLET 1 TABLET: at 07:58

## 2020-05-15 RX ADMIN — ACYCLOVIR 400 MG: 200 CAPSULE ORAL at 19:53

## 2020-05-15 RX ADMIN — ACYCLOVIR 400 MG: 200 CAPSULE ORAL at 13:47

## 2020-05-15 NOTE — PROGRESS NOTES
[facility] Antepartum Progress Note    Subjective: Patient feeling well.  Leaking small amount of clear fluid.  No bleeding.  No contractions. Denies fever, chills or vaginal bleeding.     Objective:  Vitals:    20 2150 05/15/20 0555 05/15/20 0800 05/15/20 1200   BP: 105/60 99/70 107/71 121/73   Pulse:       Resp: 18 18 16 16   Temp: 98.4  F (36.9  C) 98.2  F (36.8  C) 97.5  F (36.4  C) 97.5  F (36.4  C)   TempSrc: Oral Oral Oral Oral   Weight:       Height:           Gen: Resting comfortably in bed, NAD  Abd: Gravid, non-tender, non-distended  Ext: non-tender, no edema    FHT: , moderate variability, +accels, no decels  [place]: single ctx    Assessment: Nancy Shoemaker is a 35 year old  @ 34w2d by LMP c/w 13w1d US, HD#6 admitted for PPROM at 33w5d. Currently on latency antibiotics.    # PPROM  - Currently on D#/ latency antibiotics.   - UA negative, wet prep w/ rare PMNs, UDS neg, GC/CT neg. Await UCx  - S/p BMZ -  - S/p NICU consult  - Risks of expectant management of late  PPROM discussed. Patient was extensively counseled that PPROM has traditionally been expectant management and induction of labor at 34 weeks. There have been multiple RCTs in with increasing evidence for expectant management until 37 weeks. She is currently a good candidate for this given that her PPROM was so near to 34 weeks. Discussed with expectant management until 36 weeks she would be at increased risk for postpartum hemorrhage and Triple I/chorioamnionitis but that studies did not have increased risk of  sepsis and had decreased length of NICU stay. Patient desires further expectant management until delivery indicated or 36 weeks. Discussed that delivery may be indicated before this time if signs of PTL, abruption, fetal distress, or infection occur. Patient understands this.   -No evidence of signs or symptoms of infections.     # Rh negative  - RhIG given   - Myesha screen negative    # FWB  - Reassuring  FHT  - Growth US 47th percentile 5/12, BPP 8/8  - Twice weekly BPPs, next Friday 5/15  (tomorrow)     # PNC  - Rh negative, planning Rhogam (patient initially refused, given 5/13)   - Allergy to Tdap.   - GCT 80, RPR NR, Rubella immune, A1c 5.5%, HBsAg negative, HIV neg, HCV neg.  - GBS Negative 5/11    # Delivery planning  - Delivery with Midwives if vaginal delivery > 34 weeks.  - Cephalic on 5/13. Will recheck presentation before delivery  - Reviewed indications for a CS including abruption, fetal distress, malpresentation in labor. Discussed risks including bleeding, infection, damage to surrounding organs. Patient agrees to blood transfusion if necessary. Consent signed 5/2.     Mony Antunez DO FACOG  Maternal Fetal Medicine Specialist  Pager: [phone]  Mobile: [phone]      Mony Antunez DO  Date of Service (when I saw the patient): 05/14/20    Time Spent on this Encounter   I, Mony Antunez DO, spent a total of 15 minutes face to face or coordinating care of Nancy Shoemaker.  Over 50% of my time on the unit was spent counseling the patient and/or coordinating care regarding PPROM with desire for expectant management.

## 2020-05-15 NOTE — PLAN OF CARE
Patient up in bed this AM. Dr. Antunez reinforced letting staff know about any changes, as delivery would need to happen ASAP. Patient in agreement with plan of care and verbalized understanding. Endorsed positive fetal movement, denied contractions. Declined senna this morning. Requested additional NICU consult to reinforce education and to be able to ask questions. VSS, afebrile.

## 2020-05-15 NOTE — PLAN OF CARE
VSS, afebrile. No signs of  labor. Denies pain. Denies any symptoms of fever. NO contractions on tocometry. FHM moderate variability, +accels, no decels. Leaking clear/yellow fluid, which is unchanged. Patient has no other complaints at this time. Ambulating in room. In good spirits. Will continue to monitor.

## 2020-05-15 NOTE — PLAN OF CARE
AFVSS. Abdomen soft, non-tender to palpation. Denies contractions, cramping, backache, pelvic pressure, or bleeding. States is leaking a small amount of clear fluid. Fetus active, monitoring in progress. Rare contraction noted on monitor. Offers no complaints/concerns. Continue to monitor for s/s ptl/infection. Continue present cares.   2017 17:00

## 2020-05-16 PROBLEM — Z34.93 PREGNANCY WITH PRENATAL CARE ELSEWHERE IN THIRD TRIMESTER: Status: ACTIVE | Noted: 2020-05-16

## 2020-05-16 PROBLEM — A60.00 GENITAL HERPES SIMPLEX: Status: ACTIVE | Noted: 2020-05-16

## 2020-05-16 PROCEDURE — 25000132 ZZH RX MED GY IP 250 OP 250 PS 637: Performed by: STUDENT IN AN ORGANIZED HEALTH CARE EDUCATION/TRAINING PROGRAM

## 2020-05-16 PROCEDURE — 12000001 ZZH R&B MED SURG/OB UMMC

## 2020-05-16 RX ADMIN — AMOXICILLIN 250 MG: 250 CAPSULE ORAL at 08:15

## 2020-05-16 RX ADMIN — AMOXICILLIN 250 MG: 250 CAPSULE ORAL at 20:07

## 2020-05-16 RX ADMIN — ACYCLOVIR 400 MG: 200 CAPSULE ORAL at 20:06

## 2020-05-16 RX ADMIN — ACYCLOVIR 400 MG: 200 CAPSULE ORAL at 08:15

## 2020-05-16 RX ADMIN — ACYCLOVIR 400 MG: 200 CAPSULE ORAL at 14:06

## 2020-05-16 RX ADMIN — PRENATAL VITAMINS-IRON FUMARATE 27 MG IRON-FOLIC ACID 0.8 MG TABLET 1 TABLET: at 08:16

## 2020-05-16 RX ADMIN — AMOXICILLIN 250 MG: 250 CAPSULE ORAL at 14:06

## 2020-05-16 NOTE — PLAN OF CARE
Data: Maternal status stable. While she is leaking fluid it is clear and not foul smelling. An occasional contraction is seen during monitoring but the patient does not notice these. Fetal assessment is appropriate for gestational age.   Action: Continue with plan of care, which is close monitoring . Patient encouraged to move extremities while in bed.  Response: Patient coping with the use of internet and family on the phone.

## 2020-05-16 NOTE — PROVIDER NOTIFICATION
05/15/20 2130   Provider Notification   Provider Name/Title Dr Tomlin, PGY3   Method of Notification Electronic Page   Request Evaluate - Remote   Notification Reason Uterine Activity     Provider EVANGELINA Tomlin MD notified that patient is continuing to have painless contractions every 2-5 minutes. Patient states she occasionally feels them as tightening but has been able to sleep throughout monitoring sessions. FHR with moderate variability and accelerations, no decels. S/p IV fluid bolus x500mL.  Patient encouraged to empty bladder every few hours while awake and alert nurse or other care provider if contractions become painful or more uncomfortable.

## 2020-05-16 NOTE — PROVIDER NOTIFICATION
Phone call to discuss uterine activity and plan. Dr. Tomlin is ok with the monitors coming off as long as Nancy is not reporting any cramping/tightening or pain with contractions. Bolus was given last night so no additional fluids will be given.

## 2020-05-16 NOTE — PROGRESS NOTES
Maternal Fetal Medicine Daily Note    S: No c/o contractions, VB. Scant LOF. Good FM.    O:  Patient Vitals for the past 24 hrs:   BP Temp Temp src Pulse Resp   20 0800 131/71 97.6  F (36.4  C) Oral 108 20   20 0412 101/65 98  F (36.7  C) Oral -- 16   20 0012 104/58 98.1  F (36.7  C) Oral -- 18   05/15/20 1700 128/78 98.4  F (36.9  C) Oral -- 20   05/15/20 1200 121/73 97.5  F (36.4  C) Oral -- 16     Abd- NT  Ext - NT  FHT - 150, mod harpreet, accels, no decels  [place] - irregular contractions    Assessment: Nancy Shoemaker is a 35 year old  @ 34w3d by LMP c/w 13w1d US, HD#7 admitted for PPROM at 33w5d. Currently on latency antibiotics.     # PPROM  - Currently on D#6/7 latency antibiotics.   - S/p BMZ -  - S/p NICU consult  - Risks of expectant management of late  PPROM discussed. Patient was extensively counseled that PPROM has traditionally been expectant management and induction of labor at 34 weeks. There have been multiple RCTs in with increasing evidence for expectant management until 37 weeks. She is currently a good candidate for this given that her PPROM was so near to 34 weeks. Discussed with expectant management until 36 weeks she would be at increased risk for postpartum hemorrhage and Triple I/chorioamnionitis but that studies did not have increased risk of  sepsis and had decreased length of NICU stay. Patient desires further expectant management until delivery indicated or 36 weeks. Discussed that delivery may be indicated before this time if signs of PTL, abruption, fetal distress, or infection occur. Patient understands this.   -No evidence of signs or symptoms of infections.     # Rh negative  - RhIG given   - Myesha screen negative     # FWB  - Reassuring FHT  - Growth US 47th percentile , BPP 8/8  - Twice weekly BPPs     # PNC  - Rh negative, planning Rhogam (patient initially refused, given )   - Allergy to Tdap.   - GCT 80, RPR NR, Rubella immune,  A1c 5.5%, HBsAg negative, HIV neg, HCV neg.  - GBS Negative 5/11     # Delivery planning  - Delivery with Midwives if vaginal delivery > 34 weeks.  - Cephalic on 5/13. Will recheck presentation before delivery  - Reviewed indications for a CS including abruption, fetal distress, malpresentation in labor. Discussed risks including bleeding, infection, damage to surrounding organs. Patient agrees to blood transfusion if necessary. Consent signed 5/2.    Ron Toure MD  Maternal-Fetal Medicine

## 2020-05-16 NOTE — PLAN OF CARE
Data: Afebrile. Leaking small amounts of clear fluid. Contraction pattern increased, tatyana every 2-8 minutes, occasionally feeling them as tightening, not painful. Provider EVANGELINA Tomlin updated by phone and electronic page.  Unresolved after fluid bolus. Fetal assessment Appropriate for Gestational Age. Presumed adequate oxygenation, see flowsheet. Signs and symptoms of infection absent.  Interventions: Monitor vital signs and indicators of infection every 4 hours while awake. Continue uterine/fetal assessment every shift and PRN for increasing signs and symptoms of labor or infection. Activity level: Regular activity. Preventive measures include Positioning and Frequent voiding. Encourage active range of motion and frequent position changes.  Plan: Continue expectant management. Observe for and notify care provider of indicators of progressing labor, signs/symptoms of infection, or fetal/maternal compromise.

## 2020-05-16 NOTE — PROGRESS NOTES
"Nancy Shoemaker      MRN#: [id number]  Age: 35 year old      YOB: 1985      CNM Social Rounds    Nancy is a 35 year old  at 34w3d who presented with PPROM and elected to continue expectant management until 36 weeks if possible, desires CNM care in labor. Pt noted ambulating in room/stretching, feeling well overall.  Is excited that she has reached milestone gestational age of 34 weeks and can have CNM cares for labor and vaginal delivery.   Reviewed CNM social rounds/involvement as pt is under MFM care until labor at which time CNM team can be involved unless MD care is clinically indicated by pt or fetal status.  Pt verbalized understanding of CNM social role at this time.       Reviewed birth expectations, has written birth preferences listed as below.  She is extremely amenable to changes based on clinical need to keep infant safe and knows that the earlier she delivers the more interventions may be recommended for  safety. She is aware of possible c/section and would like it to be as family centered as possible.     - Her  will be present  - she prefers to be allowed to labor without intervention if possible. Her last birth she just \"went internal\" and listened to her body.  She prefers her  to not do a lot of touching/physical support or encouragement but she welcomes verbal support from midwives as needed.  - Wants to labor and deliver in whatever position is most comfortable for her.  - Prefers limited SVE unless clinically indicated.  Does not want to know her SVE results.    - Doesn't have need for language changes, ok with \"contraction\" but open to \"surge\" and \"wave\"  - Would not like to be offered pain medication options.  Is open to suggestions about positions/water/aromtherapy but states her last labor she just got \"animal\" about it and went internal.  - Would like IA but aware with pre term and ROM>24 hours doesn't meet protocol.  Just wants most mobility " possible.   - Aware no waterbirth if unable to have IA. May use hydrotherapy but undecided.  - If clinically appropriate would like to catch her own baby.  - If clinically appropriate would like delayed cord clamping until pulsations ceased.   - If clinically appropriate would like  to cut the cord.   - If clinically appropriate immediate skin to skin - uninterrupted for as long as possible but at least an hour.   - Exclusive breastfeeding  - No Vitamin K, Hep B, or Erythromycin eye ointment.  Reinforced needs to sign declination with RN team, encourage to do while still on ante.     - Will consider active management of third stage of labor given prolonged ROM, discuss again.       Is unsure if she is going to return to out of hospital midwife team postpartum, she is going to touch base with Gee and will update [facility] CNM.    Aware CNM in house and will be paged for labor but pt may also request CNM visit if questions.  Nancy has no unidentified unmet needs at this time.      - Chart review revealed  GBS negative. Rh negative/received Rhogam. H/o HSV - on Acyclovir suppression, will need repeat bright light exam with labor.  Will need reconfirmation of cephalic with labor.  COVID negative 5/11/2020 - discuss re screen if sample is >72 hours before IOL.       Doris ROSENBERG CNM

## 2020-05-16 NOTE — PROVIDER NOTIFICATION
Dr. Tomlin paged the following:    Nancy Shoemaker is having ctx again q 6-8 but is not feeling tightening/cramping/pain at all. Would you like to do another 500mL fluid bolus? Thanks!

## 2020-05-17 PROCEDURE — 25000132 ZZH RX MED GY IP 250 OP 250 PS 637: Performed by: STUDENT IN AN ORGANIZED HEALTH CARE EDUCATION/TRAINING PROGRAM

## 2020-05-17 PROCEDURE — 12000001 ZZH R&B MED SURG/OB UMMC

## 2020-05-17 RX ADMIN — PRENATAL VITAMINS-IRON FUMARATE 27 MG IRON-FOLIC ACID 0.8 MG TABLET 1 TABLET: at 08:07

## 2020-05-17 RX ADMIN — ACYCLOVIR 400 MG: 200 CAPSULE ORAL at 08:07

## 2020-05-17 RX ADMIN — ACYCLOVIR 400 MG: 200 CAPSULE ORAL at 19:56

## 2020-05-17 RX ADMIN — AMOXICILLIN 250 MG: 250 CAPSULE ORAL at 08:07

## 2020-05-17 RX ADMIN — AMOXICILLIN 250 MG: 250 CAPSULE ORAL at 13:12

## 2020-05-17 RX ADMIN — ACYCLOVIR 400 MG: 200 CAPSULE ORAL at 13:12

## 2020-05-17 RX ADMIN — AMOXICILLIN 250 MG: 250 CAPSULE ORAL at 19:56

## 2020-05-17 NOTE — PLAN OF CARE
Data: Maternal status is stable, while she has a rare mild contraction she does not feel them. She continues to leak scant clear fluid that has no odor. Fetal assessment is appropriate for gestational age.   Action: Continue with plan of care, which is close monitoring. Patient encouraged to move extremities while in bed.  Response: Patient coping with TV, telephone and computer.

## 2020-05-17 NOTE — PROGRESS NOTES
Maternal Fetal Medicine Daily Note    S: No c/o contractions or VB. Scant clear LOF. Good FM.    O:  Patient Vitals for the past 24 hrs:   BP Temp Temp src Pulse Resp   20 0800 117/69 98.1  F (36.7  C) Oral 86 20   20 0409 110/63 98  F (36.7  C) Oral -- 20 0006 118/75 98.1  F (36.7  C) Oral -- 20 115/74 98.3  F (36.8  C) Oral -- 20 1633 115/65 98.1  F (36.7  C) Oral -- 20 1214 107/61 97.6  F (36.4  C) Oral 90 18     Abd - NT  Ext - NT uterus    FHT - 145, mod harpreet, accels, decels  [place] - rare contractions    Assessment: Nancy Shoemaker is a 35 year old  @ 34w4d by LMP c/w 13w1d US, HD#8 admitted for PPROM at 33w5d. Currently on latency antibiotics.     # PPROM  - Currently on D#7/7 latency antibiotics.   - S/p BMZ -  - S/p NICU consult  - Risks of expectant management of late  PPROM discussed. Patient was extensively counseled that PPROM has traditionally been expectant management and induction of labor at 34 weeks. There have been multiple RCTs in with increasing evidence for expectant management until 37 weeks. She is currently a good candidate for this given that her PPROM was so near to 34 weeks. Discussed with expectant management until 36 weeks she would be at increased risk for postpartum hemorrhage and Triple I/chorioamnionitis but that studies did not have increased risk of  sepsis and had decreased length of NICU stay. Patient desires further expectant management until delivery indicated or 36 weeks. Discussed that delivery may be indicated before this time if signs of PTL, abruption, fetal distress, or infection occur. Patient understands this.   -No evidence of signs or symptoms of infections.     # Rh negative  - RhIG given   - Myesha screen negative     # FWB  - Reassuring FHT  - Growth US 47th percentile , BPP   - Twice weekly BPPs (Tuesday, Friday)     # PNC  - Rh negative, planning Rhogam (patient initially  refused, given 5/13)   - Allergy to Tdap.   - GCT 80, RPR NR, Rubella immune, A1c 5.5%, HBsAg negative, HIV neg, HCV neg.  - GBS Negative 5/11     # Delivery planning  - Delivery with Midwives if vaginal delivery > 34 weeks.  - Cephalic on 5/13. Will recheck presentation before delivery  - Reviewed indications for a CS including abruption, fetal distress, malpresentation in labor. Discussed risks including bleeding, infection, damage to surrounding organs. Patient agrees to blood transfusion if necessary. Consent signed 5/2.    Ron Toure MD  Maternal-Fetal Medicine

## 2020-05-17 NOTE — PLAN OF CARE
Data: Afebrile. Leaking scant  amounts of clear fluid. Contraction pattern stable and within parameters. Fetal assessment Reactive. Signs and symptoms of infection absent.  Interventions: Monitor vital signs and indicators of infection every 4 hours while awake. Continue uterine/fetal assessment TID and PRN. Activity level: Regular activity. Preventive measures include Positioning and Frequent voiding. Encourage active range of motion and frequent position changes.  Plan: Continue expectant management. Observe for and notify care provider of indicators of progressing labor, signs/symptoms of infection, or fetal/maternal compromise.    Category I tracing, few contractions seen, patient not feeling any contractions. Denies fever, chills, or abdominal pain. Will continue to monitor.

## 2020-05-17 NOTE — PLAN OF CARE
VSS. Afebrile. Pt reports leaking of fluid, small amt, clear to yellowish in color, w/o odor. RN confirmed after seeing pad. Denies feeling contractions, pain, bleeding, chills, abdominal pain. See flowsheets for EFM/TOCO monitoring. Will continue to monitor w/ current plan of care.

## 2020-05-18 PROCEDURE — 25000132 ZZH RX MED GY IP 250 OP 250 PS 637: Performed by: STUDENT IN AN ORGANIZED HEALTH CARE EDUCATION/TRAINING PROGRAM

## 2020-05-18 PROCEDURE — 12000001 ZZH R&B MED SURG/OB UMMC

## 2020-05-18 RX ADMIN — ACYCLOVIR 400 MG: 200 CAPSULE ORAL at 19:47

## 2020-05-18 RX ADMIN — ACYCLOVIR 400 MG: 200 CAPSULE ORAL at 08:04

## 2020-05-18 RX ADMIN — AMOXICILLIN 250 MG: 250 CAPSULE ORAL at 08:03

## 2020-05-18 RX ADMIN — PRENATAL VITAMINS-IRON FUMARATE 27 MG IRON-FOLIC ACID 0.8 MG TABLET 1 TABLET: at 08:04

## 2020-05-18 RX ADMIN — ACYCLOVIR 400 MG: 200 CAPSULE ORAL at 14:05

## 2020-05-18 RX ADMIN — AMOXICILLIN 250 MG: 250 CAPSULE ORAL at 14:05

## 2020-05-18 NOTE — PROGRESS NOTES
Brief Progress Note    While having evening NST, toco picking up regular contractions every 3-4 minutes. Patient can feel them, but feels that they are mild like [place]. Denies any dysuria, worsening abdominal pain, foul-smelling discharge, vaginal irritation, or increased pelvic pressure.     Patient Vitals for the past 24 hrs:   BP Temp Temp src Pulse Resp   20 1959 126/81 98.5  F (36.9  C) Oral -- 16   20 1740 -- 98.2  F (36.8  C) Oral -- 18   20 1200 109/66 97.8  F (36.6  C) Oral 91 18   20 0800 117/69 98.1  F (36.7  C) Oral 86 20   20 0409 110/63 98  F (36.7  C) Oral -- 16   20 0006 118/75 98.1  F (36.7  C) Oral -- 16     Gen: NAD, comfortably sitting up  Abd: Soft, non-tender, gravid  SSE: minimal clear fluid in vaginal vault. Cervix appears visually closed.     FHT: 150 bpm, moderate variability, + accels, no decels  [place]: q3-5 minutes    A/P: 35 year old  at 34w4d by LMP c/w 13w1d US HD#8 with PPROM at 33w5d, desiring expectant management. S/p D#7/7 latency antibiotics. During NST, tatyana regularly, but not painfully. Cervix appears closed on sterile speculum exam. No signs of infection. FHT category I and reactive, reassuring. Not in labor currently. Will continue with TID monitoring and will reassess if any clinical change (patient feels contractions are stronger) or new symptoms.    Discussed with Dr. Mesfin Andrew MD, MPH  OBGYN PGY-2  2020 9:07 PM

## 2020-05-18 NOTE — PROVIDER NOTIFICATION
05/18/20 0636   Provider Notification   Provider Name/Title Dr. Andrew   Method of Notification Phone   Notification Reason Uterine Activity   Provider aware of uterine activity and reviewed strip. Plan to continue expectant management and routine monitoring as long as pt states things are feeling the same.

## 2020-05-18 NOTE — PLAN OF CARE
VSS. Afebrile. Leaking scant clear fluid. Reports occasional contraction felt as tightening. Denies abdominal tenderness, bleeding. Reports active fetal movement. EFM category 1. Continue to monitor per protocol.

## 2020-05-18 NOTE — PROGRESS NOTES
Maternal Fetal Medicine Daily Note    S: Some mild tightening last night that showed as contractions on monitor. No regular or painful ctx. Scant clear LOF. Good FM. No VB.   O:  Patient Vitals for the past 24 hrs:   BP Temp Temp src Pulse Resp   20 0600 105/67 97.5  F (36.4  C) Oral -- 20 2357 93/55 98.4  F (36.9  C) Oral -- 20 1959 126/81 98.5  F (36.9  C) Oral -- 20 1740 -- 98.2  F (36.8  C) Oral -- 20 1200 109/66 97.8  F (36.6  C) Oral 91 20 0800 117/69 98.1  F (36.7  C) Oral 86 20     Gen - appears well  Abd - soft, no fundal tenderness   Ext - soft     FHT - 150, mod harpreet, +accels, no decels  [place] - int contractions, irregular, q8m     Assessment: Nancy Shoemaker is a 35 year old  @ 34w5d by LMP c/w 13w1d US, HD#9 admitted for PPROM at 33w5d. Currently on latency antibiotics.     # PPROM  - s/p 7d latency antibiotics.   - S/p BMZ -  - S/p NICU consult  - Risks of expectant management of late  PPROM discussed. Patient was extensively counseled that PPROM has traditionally been expectant management and induction of labor at 34 weeks. There have been multiple RCTs in with increasing evidence for expectant management until 37 weeks. She is currently a good candidate for this given that her PPROM was so near to 34 weeks. Discussed with expectant management until 36 weeks she would be at increased risk for postpartum hemorrhage and Triple I/chorioamnionitis but that studies did not have increased risk of  sepsis and had decreased length of NICU stay. Patient desires further expectant management until delivery indicated or 36 weeks. Discussed that delivery may be indicated before this time if signs of PTL, abruption, fetal distress, or infection occur. Patient understands this.   -No signs or symptoms of infection    # Rh negative  - RhIG given   - Myesha screen negative     # FWB  - Reassuring FHT  - Growth US 47th percentile  5/12, BPP 8/8  - Twice weekly BPPs (Tuesday, Friday)     # PNC  - Rh negative, s/p Rhogam 5/13  - Allergy to Tdap.   - GCT 80, RPR NR, Rubella immune, A1c 5.5%, HBsAg negative, HIV neg, HCV neg.  - GBS Negative 5/11     # Delivery planning  - Delivery with Midwives if vaginal delivery > 34 weeks.  - Cephalic on 5/13. Will recheck presentation before delivery  - Reviewed indications for a CS including abruption, fetal distress, malpresentation in labor. Discussed risks including bleeding, infection, damage to surrounding organs. Patient agrees to blood transfusion if necessary. Consent signed 5/2.    D/w Dr. Evelina Jackson MD

## 2020-05-18 NOTE — PLAN OF CARE
Afebrile, no signs or symptoms of uterine infection or labor.  VS stable.  IV site symptomatic, signs of phlebitis, swollen with drainage and tender.  IV removed.  Site cleaned with disinfectant, bacitracin zinc ointment applied to site and clear dressing placed over top so able to monitor for increasing signs of infection.  Will continue with present cares.

## 2020-05-18 NOTE — PLAN OF CARE
"VSS, afebrile. No signs or symptoms of infection. Leaking scant amounts of clear fluid. Pt denies any vaginal bleeding, lower back pain, headache, or changes in vision. FHR Category 1. Ctxing every 2-8 minutes during evening monitoring, pt reported feeling occasional tightening \"like [place]\". Provider performed sterile speculum exam: cervix remains closed. Ctxing every 3-9 minutes during AM monitoring, feels the same as last evening and provider aware. Pt will report any changes or signs of PTL. Will continue with plan of care.  "

## 2020-05-18 NOTE — PROVIDER NOTIFICATION
05/17/20 2044   Provider Notification   Provider Name/Title Dr. Andrew   Method of Notification At Bedside   Notification Reason SVE   Provider at bedside for sterile speculum exam due to uterine activity. Cervix closed per provider. OK to remove monitors at this time.

## 2020-05-18 NOTE — PROVIDER NOTIFICATION
05/17/20 1948   Provider Notification   Provider Name/Title Dr. Andrew   Method of Notification Electronic Page   Notification Reason Uterine Activity   Pt tatyana every 3-8 minutes. States she is not feeling ctxs. FHR AGA. Oral hydration encouraged. Would you like to continue monitoring?

## 2020-05-19 ENCOUNTER — HOSPITAL ENCOUNTER (INPATIENT)
Dept: ULTRASOUND IMAGING | Facility: CLINIC | Age: 35
End: 2020-05-19
Payer: COMMERCIAL

## 2020-05-19 PROCEDURE — 86901 BLOOD TYPING SEROLOGIC RH(D): CPT | Performed by: OBSTETRICS & GYNECOLOGY

## 2020-05-19 PROCEDURE — 86900 BLOOD TYPING SEROLOGIC ABO: CPT | Performed by: OBSTETRICS & GYNECOLOGY

## 2020-05-19 PROCEDURE — 25000132 ZZH RX MED GY IP 250 OP 250 PS 637: Performed by: STUDENT IN AN ORGANIZED HEALTH CARE EDUCATION/TRAINING PROGRAM

## 2020-05-19 PROCEDURE — 86870 RBC ANTIBODY IDENTIFICATION: CPT | Performed by: OBSTETRICS & GYNECOLOGY

## 2020-05-19 PROCEDURE — 76819 FETAL BIOPHYS PROFIL W/O NST: CPT

## 2020-05-19 PROCEDURE — 36415 COLL VENOUS BLD VENIPUNCTURE: CPT | Performed by: OBSTETRICS & GYNECOLOGY

## 2020-05-19 PROCEDURE — 12000001 ZZH R&B MED SURG/OB UMMC

## 2020-05-19 PROCEDURE — 86850 RBC ANTIBODY SCREEN: CPT | Performed by: OBSTETRICS & GYNECOLOGY

## 2020-05-19 RX ADMIN — ACYCLOVIR 400 MG: 200 CAPSULE ORAL at 07:45

## 2020-05-19 RX ADMIN — PRENATAL VITAMINS-IRON FUMARATE 27 MG IRON-FOLIC ACID 0.8 MG TABLET 1 TABLET: at 07:44

## 2020-05-19 RX ADMIN — ACYCLOVIR 400 MG: 200 CAPSULE ORAL at 19:45

## 2020-05-19 RX ADMIN — ACYCLOVIR 400 MG: 200 CAPSULE ORAL at 14:00

## 2020-05-19 ASSESSMENT — MIFFLIN-ST. JEOR: SCORE: 1471.09

## 2020-05-19 NOTE — PLAN OF CARE
Continues to leak a scant amount of clear amniotic fluid. Denies bleeding. Had a rare contraction during monitor secession this evening. FHT's 140 with moderate variability and accelerations. No decelerations noted. VSS and afebrile. Offers no complaints. Stable condition.

## 2020-05-19 NOTE — PROGRESS NOTES
Maternal Fetal Medicine Daily Note    S: Overall doing well. Contractions calmed down a little bit. Continues to leak clear fluid. Denies vaginal bleeding. +FM. Denies nausea/vomiting, constipation, fevers/chills.    O:  Patient Vitals for the past 24 hrs:   BP Temp Temp src Pulse Resp Weight   20 0745 102/64 97.7  F (36.5  C) Oral -- 18 --   20 0600 98/61 97.6  F (36.4  C) Oral 81 16 70.2 kg (154 lb 11.2 oz)   20 0000 103/61 98.3  F (36.8  C) Oral 80 16 --   20 2139 111/66 97.3  F (36.3  C) Oral -- 16 --   20 1710 119/81 97.6  F (36.4  C) Oral -- 16 --   20 1240 118/62 98.3  F (36.8  C) Oral -- 18 --     Gen - appears well  Abd - soft, no fundal tenderness   Ext - soft     FHT - 150, mod harpreet, +accels, no decels  [place] - int contractions, irregular, q8m     US BPP 20: Pending    Assessment: Nancy Shoemaker is a 35 year old  @ 34w6d by LMP c/w 13w1d US, HD#10 admitted for PPROM at 33w5d. Currently on latency antibiotics.     # PPROM  - s/p 7d latency antibiotics.   - S/p BMZ -  - S/p NICU consult  - Risks of expectant management of late  PPROM discussed. Patient was extensively counseled that PPROM has traditionally been expectant management and induction of labor at 34 weeks. There have been multiple RCTs in with increasing evidence for expectant management until 37 weeks. She is currently a good candidate for this given that her PPROM was so near to 34 weeks. Discussed with expectant management until 36 weeks she would be at increased risk for postpartum hemorrhage and Triple I/chorioamnionitis but that studies did not have increased risk of  sepsis and had decreased length of NICU stay. Patient desires further expectant management until delivery indicated or 36 weeks. Discussed that delivery may be indicated before this time if signs of PTL, abruption, fetal distress, or infection occur. Patient understands this.   -No signs or symptoms of  infection     # FWB  - Reassuring FHT, continue TID NSTs  - Growth US (5/12) 47th percentile   - BPP (5/15) 8/8, MVP 6.3cm, cephalic  - Twice weekly BPPs (Tuesday, Friday)     # PNC  - Rh negative, s/p Rhogam 5/13, ROBERTA screen neg  - Allergy to Tdap.   - GCT 80, RPR NR, Rubella immune, A1c 5.5%, HBsAg negative, HIV neg, HCV neg.  - GBS Negative 5/11     # Delivery planning  - Delivery with Midwives if vaginal delivery > 34 weeks.  - Cephalic on 5/15. Will recheck presentation before delivery  - Reviewed indications for a CS including abruption, fetal distress, malpresentation in labor. Discussed risks including bleeding, infection, damage to surrounding organs. Patient agrees to blood transfusion if necessary. Consent signed 5/2.    Discussed with Dr. Evelina Tomlin MD  OBGYN Resident PGY3  05/19/2020 10:01 AM

## 2020-05-19 NOTE — PLAN OF CARE
VSS. Afebrile. Leaking scant clear fluid, reports occasional painless contraction. Denies abdominal tenderness, vaginal bleeding. EFM category 1. No other complaints. Continue present management.

## 2020-05-20 PROCEDURE — 12000001 ZZH R&B MED SURG/OB UMMC

## 2020-05-20 PROCEDURE — 25000132 ZZH RX MED GY IP 250 OP 250 PS 637: Performed by: STUDENT IN AN ORGANIZED HEALTH CARE EDUCATION/TRAINING PROGRAM

## 2020-05-20 RX ADMIN — PRENATAL VITAMINS-IRON FUMARATE 27 MG IRON-FOLIC ACID 0.8 MG TABLET 1 TABLET: at 07:24

## 2020-05-20 RX ADMIN — ACYCLOVIR 400 MG: 200 CAPSULE ORAL at 19:48

## 2020-05-20 RX ADMIN — ACYCLOVIR 400 MG: 200 CAPSULE ORAL at 07:24

## 2020-05-20 RX ADMIN — ACYCLOVIR 400 MG: 200 CAPSULE ORAL at 14:16

## 2020-05-20 NOTE — PROGRESS NOTES
"Maternal Fetal Medicine  Progress Note      S: Nancy is doing overall doing well. Denies cramping or contractions. Continues to leak scant clear fluid. Denies vaginal bleeding. +FM. Denies nausea/vomiting, constipation, fevers/chills.    O:  /67   Pulse 96   Temp 97.9  F (36.6  C) (Oral)   Resp 18   Ht 1.753 m (5' 9\")   Wt 70.2 kg (154 lb 11.2 oz)   BMI 22.85 kg/m      Gen - appears well  Abd - soft, no fundal tenderness   Ext - No edema or tenderness.     FHT - 125, mod harpreet, +accels, no decels  [place] - Irritability, no contractions.       Assessment: Nancy Shoemaker is a 35 year old  @ 35w0d by LMP c/w 13w1d US, HD#12 admitted for PPROM at 33w5d. Currently on latency antibiotics.     # PPROM  - s/p 7d latency antibiotics.   - S/p BMZ -  - S/p NICU consult  - Risks of expectant management of late  PPROM discussed. Patient was extensively counseled that PPROM has traditionally been expectant management and induction of labor at 34 weeks. There have been multiple RCTs in with increasing evidence for expectant management until 37 weeks. She is currently a good candidate for this given that her PPROM was so near to 34 weeks. Discussed with expectant management until 36 weeks she would be at increased risk for postpartum hemorrhage and Triple I/chorioamnionitis but that studies did not have increased risk of  sepsis and had decreased length of NICU stay. Patient desires further expectant management until delivery indicated or 36 weeks. Discussed that delivery may be indicated before this time if signs of PTL, abruption, fetal distress, or infection occur. Patient understands this.   -No signs or symptoms of infection     # FWB  - Reassuring FHT, continue TID NSTs  - Growth US () 47th percentile   - BPP (5/15) , MVP 6.3cm, cephalic  - Twice weekly BPPs (Tuesday, Friday)     # PNC  - Rh negative, s/p Rhogam , ROBERTA screen neg  - Allergy to Tdap.   - GCT 80, RPR NR, Rubella " immune, A1c 5.5%, HBsAg negative, HIV neg, HCV neg.  - GBS Negative 5/11     # Delivery planning  - Delivery with Midwives if vaginal delivery > 34 weeks.  - Cephalic on 5/15. Will recheck presentation before delivery  - Reviewed indications for a CS including abruption, fetal distress, malpresentation in labor. Discussed risks including bleeding, infection, damage to surrounding organs. Patient agrees to blood transfusion if necessary. Consent signed 5/2.    #Disposition: Continue inpatient management   Pt allowed to ambulate in the halls of this unit wearing a mask.     Discussed with Dr. Evelina Restrepo MD  MFM Fellow

## 2020-05-20 NOTE — PLAN OF CARE
No contractions nor bleeding. Continues to leak clear amniotic fluid. VSS and afebrile. FHT's 140 with moderate variability and accelerations. No decelerations noted. Offers no complaints. Pleasant and cooperative. Stable condition.

## 2020-05-20 NOTE — PLAN OF CARE
Patient is stable following PPROM 5/11/20. VSS, afebrile, denies pain, cramping or bleeding. Continues to leak small amount of clear fluid. Continue expectant management.

## 2020-05-20 NOTE — PROGRESS NOTES
"CLINICAL NUTRITION SERVICES - ASSESSMENT NOTE     Nutrition Prescription    RECOMMENDATIONS FOR MDs/PROVIDERS TO ORDER:  None today     Malnutrition Status:    Unable to determine due to no NFPA    Recommendations already ordered by Registered Dietitian (RD):  RD will offer additional PEDS menu items     Future/Additional Recommendations:  Monitor meal intakes  Monitor weight trends      REASON FOR ASSESSMENT  Nancy Shoemaker is a/an 35 year old female assessed by the dietitian for LOS    NUTRITION/MEDICAL HISTORY  Per chart review: Pt is  at 35w0d admitted for PPROM.     Patient visit done by phone today in light of reduced in-person exposure during COVID outbreak. Per pt, has good appetite, taking >50% of meals TID, has family bringing in foods as well. Does admit to menu fatigue, offered PEDS menu options, pt agreeing. Reviewed weight trends below,     CURRENT NUTRITION ORDERS  Diet: Regular  Intake/Tolerance: Pt reports tolerating meals well    LABS  Labs reviewed    MEDICATIONS  Acyclovir, Senna-docusate, Prenatal MVI    ANTHROPOMETRICS  Height: 175.3 cm (5' 9\")  Most Recent Weight: 70.2 kg (154 lb 11.2 oz)    Ideal Weight Gain Range for Pregnancy: 150-160 lbs   Weight History:   20 0600  70.2 kg (154 lb 11.2 oz)    20 0825  69.3 kg (152 lb 12.8 oz)    20 1800  69.9 kg (154 lb)      Pre pregnancy UBW: 125 lbs (56.8 kg) per pt report     Dosing Weight: 56.8 kg     ASSESSED NUTRITION NEEDS  Estimated Energy Needs: 7179-4071 kcals/day (25 - 30 kcals/kg +452 kcal for 3rd trimester)  Justification: Pregnancy   Estimated Protein Needs: 70 grams protein/day (1.2 grams of pro/kg)  Justification: Pregnancy   Estimated Fluid Needs: 1990 mL/day (35 mL/kg)   Justification: Pregnancy     MALNUTRITION  % Intake: No decreased intake noted  % Weight Loss: None noted  Subcutaneous Fat Loss: Unable to assess  Muscle Loss: Unable to assess  Fluid Accumulation/Edema: Unable to assess  Malnutrition Diagnosis: " Unable to determine due to no NFPA    NUTRITION DIAGNOSIS  Predicted inadequate nutrient intake related to menu fatigue, although pt reports taking meals adequately at this time     INTERVENTIONS  Implementation  Nutrition Education: Menu options reviewed with pt    Modify composition of meals/snacks     Goals  Patient to consume % of nutritionally adequate meal trays TID, or the equivalent with supplements/snacks.     Monitoring/Evaluation  Progress toward goals will be monitored and evaluated per protocol.

## 2020-05-21 PROCEDURE — 12000001 ZZH R&B MED SURG/OB UMMC

## 2020-05-21 PROCEDURE — 25000132 ZZH RX MED GY IP 250 OP 250 PS 637: Performed by: STUDENT IN AN ORGANIZED HEALTH CARE EDUCATION/TRAINING PROGRAM

## 2020-05-21 RX ADMIN — ACYCLOVIR 400 MG: 200 CAPSULE ORAL at 19:55

## 2020-05-21 RX ADMIN — PRENATAL VITAMINS-IRON FUMARATE 27 MG IRON-FOLIC ACID 0.8 MG TABLET 1 TABLET: at 07:57

## 2020-05-21 RX ADMIN — ACYCLOVIR 400 MG: 200 CAPSULE ORAL at 13:50

## 2020-05-21 RX ADMIN — ACYCLOVIR 400 MG: 200 CAPSULE ORAL at 07:57

## 2020-05-21 NOTE — PLAN OF CARE
VSS, pt afebrile, denies pain. Pt stable overnight. Denies contractions or vaginal bleeding. Leaking scant amounts of clear fluid. Pt reports active fetal movement. See flow record for FHTs and uterine activity. Continue current plan of care

## 2020-05-21 NOTE — PLAN OF CARE
Data: Maternal status stable. Leaking small amounts of clear fluid, denies any discomfort of any kind. VSS, afebrile. Fetal assessment is reactive.   Action: Continue with plan of care, which is close monitoring. Patient encouraged to move extremities while in bed.  Response: Patient coping with support of family on phone, use of computer and TV.

## 2020-05-21 NOTE — PROGRESS NOTES
Maternal Fetal Medicine Daily Note    S: Overall doing well. No contractions or cramping overnight. Continues to leak clear fluid. No complaints today. Enjoying walking in the [place].    O:  Patient Vitals for the past 24 hrs:   BP Temp Temp src Pulse Heart Rate Resp   20 0750 108/66 98.1  F (36.7  C) Oral 96 -- 20   20 0605 100/61 97.9  F (36.6  C) Oral -- 82 16   20 0200 109/64 98.1  F (36.7  C) Oral -- 77 18   20 2212 108/64 98.2  F (36.8  C) Oral -- 86 16   20 1825 124/72 98.4  F (36.9  C) Oral -- -- 18   20 1417 102/60 98.5  F (36.9  C) Oral -- -- 18   20 1020 124/67 97.9  F (36.6  C) Oral -- -- 18     Gen - appears well  Abd - soft, no fundal tenderness   Ext - soft     FHT - 140, mod harpreet, +accels, no decels  [place] - no contractions    US BPP 20: 8/8, MVP 3.8    Assessment: Nancy Shoemaker is a 35 year old  @ 35w1d by LMP c/w 13w1d US, HD#12 admitted for PPROM at 33w5d. Currently on latency antibiotics.     # PPROM  - s/p 7d latency antibiotics.   - S/p BMZ -  - S/p NICU consult  - Risks of expectant management of late  PPROM discussed. Patient was extensively counseled that PPROM has traditionally been expectant management and induction of labor at 34 weeks. There have been multiple RCTs in with increasing evidence for expectant management until 37 weeks. She is currently a good candidate for this given that her PPROM was so near to 34 weeks. Discussed with expectant management until 36 weeks she would be at increased risk for postpartum hemorrhage and Triple I/chorioamnionitis but that studies did not have increased risk of  sepsis and had decreased length of NICU stay. Patient desires further expectant management until delivery indicated or 36+6 weeks. Discussed that delivery may be indicated before this time if signs of PTL, abruption, fetal distress, or infection occur. Patient understands this.   -No signs or symptoms of  infection     # FWB  - Reassuring FHT, continue TID NSTs  - Growth US (5/12) 47th percentile   - BPP (5/19) 8/8, MVP 3.8cm, cephalic  - Twice weekly BPPs (Tuesday, Friday)     # PNC  - Rh negative, s/p Rhogam 5/13, ROBERTA screen neg  - Allergy to Tdap.   - GCT 80, RPR NR, Rubella immune, A1c 5.5%, HBsAg negative, HIV neg, HCV neg.  - GBS Negative 5/11     # Delivery planning  - Delivery with Midwives if vaginal delivery, now > 34 weeks.  - Cephalic on 5/19. Will recheck presentation before delivery  - Reviewed indications for a CS including abruption, fetal distress, malpresentation in labor. Discussed risks including bleeding, infection, damage to surrounding organs. Patient agrees to blood transfusion if necessary. Consent signed 5/2.    Patient seen with Dr. Evelina Andrew MD, MPH  OBGYN PGY-2  5/21/2020 9:24 AM

## 2020-05-22 ENCOUNTER — HOSPITAL ENCOUNTER (INPATIENT)
Dept: ULTRASOUND IMAGING | Facility: CLINIC | Age: 35
End: 2020-05-22
Payer: COMMERCIAL

## 2020-05-22 DIAGNOSIS — O92.70 DISORDER OF LACTATION: Primary | ICD-10-CM

## 2020-05-22 LAB
ABO + RH BLD: ABNORMAL
ABO + RH BLD: ABNORMAL
BLD GP AB INVEST PLASRBC-IMP: ABNORMAL
BLD GP AB SCN SERPL QL: ABNORMAL
BLOOD BANK CMNT PATIENT-IMP: ABNORMAL
BLOOD BANK CMNT PATIENT-IMP: ABNORMAL
SPECIMEN EXP DATE BLD: ABNORMAL

## 2020-05-22 PROCEDURE — 86901 BLOOD TYPING SEROLOGIC RH(D): CPT | Performed by: OBSTETRICS & GYNECOLOGY

## 2020-05-22 PROCEDURE — 25000132 ZZH RX MED GY IP 250 OP 250 PS 637: Performed by: STUDENT IN AN ORGANIZED HEALTH CARE EDUCATION/TRAINING PROGRAM

## 2020-05-22 PROCEDURE — 12000001 ZZH R&B MED SURG/OB UMMC

## 2020-05-22 PROCEDURE — 76819 FETAL BIOPHYS PROFIL W/O NST: CPT

## 2020-05-22 PROCEDURE — 36415 COLL VENOUS BLD VENIPUNCTURE: CPT | Performed by: OBSTETRICS & GYNECOLOGY

## 2020-05-22 PROCEDURE — 86870 RBC ANTIBODY IDENTIFICATION: CPT | Performed by: OBSTETRICS & GYNECOLOGY

## 2020-05-22 PROCEDURE — 86850 RBC ANTIBODY SCREEN: CPT | Performed by: OBSTETRICS & GYNECOLOGY

## 2020-05-22 PROCEDURE — 86900 BLOOD TYPING SEROLOGIC ABO: CPT | Performed by: OBSTETRICS & GYNECOLOGY

## 2020-05-22 RX ORDER — BREAST PUMP
1 EACH MISCELLANEOUS PRN
Qty: 1 EACH | Refills: 0 | Status: SHIPPED | OUTPATIENT
Start: 2020-05-22

## 2020-05-22 RX ADMIN — ACYCLOVIR 400 MG: 200 CAPSULE ORAL at 19:54

## 2020-05-22 RX ADMIN — PRENATAL VITAMINS-IRON FUMARATE 27 MG IRON-FOLIC ACID 0.8 MG TABLET 1 TABLET: at 09:20

## 2020-05-22 RX ADMIN — ACYCLOVIR 400 MG: 200 CAPSULE ORAL at 09:20

## 2020-05-22 RX ADMIN — ACYCLOVIR 400 MG: 200 CAPSULE ORAL at 14:31

## 2020-05-22 NOTE — PLAN OF CARE
VSS, afebrile. Pt reports scant leaking of clear fluid, denies vaginal bleeding or contraction pain. TOCO quiet, FHR normal baseline/moderate variability/+accelerations, see flowsheets for detail. Pt up to ambulate in farmer multiple times during shift; has no concerns at this time. Continue to monitor closely and follow current plan of care.

## 2020-05-22 NOTE — PROGRESS NOTES
Maternal Fetal Medicine Daily Note    S: Overall doing well. No contractions or cramping overnight. Continues to leak clear fluid. No complaints today. Asking about breast pump prescription.     O:  Patient Vitals for the past 24 hrs:   BP Temp Temp src Pulse Resp   20 0359 -- 97.8  F (36.6  C) Oral -- 16   20 2350 111/71 98.1  F (36.7  C) Oral -- 18   20 2000 -- 98.1  F (36.7  C) Oral -- --   20 1600 110/73 98  F (36.7  C) Oral -- 18   20 1200 (!) 151/76 97.6  F (36.4  C) Oral 117 20     Gen - appears well  Abd - soft, no fundal tenderness   Ext - soft     FHT - 135, mod harpreet, +accels, no decels  [place] - no contractions    BPP today - read pending     Assessment: Nancy Shoemaker is a 35 year old  @ 35w2d by LMP c/w 13w1d US, HD#13 admitted for PPROM at 33w5d. Currently on latency antibiotics.     # PPROM  - s/p 7d latency antibiotics.   - S/p BMZ -  - S/p NICU consult  - Risks of expectant management of late  PPROM discussed. Patient was extensively counseled that PPROM has traditionally been expectant management and induction of labor at 34 weeks. There have been multiple RCTs in with increasing evidence for expectant management until 37 weeks. She is currently a good candidate for this given that her PPROM was so near to 34 weeks. Discussed with expectant management until 36 weeks she would be at increased risk for postpartum hemorrhage and Triple I/chorioamnionitis but that studies did not have increased risk of  sepsis and had decreased length of NICU stay. Patient desires further expectant management until delivery indicated or 36+6 weeks. Discussed that delivery may be indicated before this time if signs of PTL, abruption, fetal distress, or infection occur. Patient understands this.   -No signs or symptoms of infection  -Desires expectant management until 36w6d      #Elevated BP x1  - continue to monitor, HELLP labs if second BP     # FWB  - Reassuring  FHT, continue TID NSTs  - Growth US (5/12) 47th percentile   - BPP (5/19) 8/8, MVP 3.8cm, cephalic  - Twice weekly BPPs (Tuesday, Friday)     # PNC  - Rh negative, s/p Rhogam 5/13, ROBERTA screen neg  - Allergy to Tdap.   - GCT 80, RPR NR, Rubella immune, A1c 5.5%, HBsAg negative, HIV neg, HCV neg.  - GBS Negative 5/11     # Delivery planning  - Delivery with Midwives if vaginal delivery, now > 34 weeks.  - Cephalic on 5/19. Will recheck presentation before delivery  - Reviewed indications for a CS including abruption, fetal distress, malpresentation in labor. Discussed risks including bleeding, infection, damage to surrounding organs. Patient agrees to blood transfusion if necessary. Consent signed 5/2.    Patient seen with Dr. Evelina Jackson MD

## 2020-05-22 NOTE — PLAN OF CARE
Pt resting and stable overnight. VSS, afebrile, denies pain or ctxs. Leaking scant amounts of clear fluid. See flow sheets for FHR interpretation. Active fetal movement present. Pt offers no complaints or needs at this time. Will continue with plan of care.

## 2020-05-22 NOTE — PLAN OF CARE
Afebrile, pt states is comfortable. Denies feeling ctx's or bleeding. Leaking small amount of clear fluid. Ambulating on unit halls. Saline locked flushed. Baby reactive on monitor no ctx's.

## 2020-05-23 PROCEDURE — 25000132 ZZH RX MED GY IP 250 OP 250 PS 637: Performed by: STUDENT IN AN ORGANIZED HEALTH CARE EDUCATION/TRAINING PROGRAM

## 2020-05-23 PROCEDURE — 12000001 ZZH R&B MED SURG/OB UMMC

## 2020-05-23 RX ADMIN — ACYCLOVIR 400 MG: 200 CAPSULE ORAL at 08:52

## 2020-05-23 RX ADMIN — PRENATAL VITAMINS-IRON FUMARATE 27 MG IRON-FOLIC ACID 0.8 MG TABLET 1 TABLET: at 08:52

## 2020-05-23 RX ADMIN — ACYCLOVIR 400 MG: 200 CAPSULE ORAL at 20:00

## 2020-05-23 RX ADMIN — ACYCLOVIR 400 MG: 200 CAPSULE ORAL at 14:11

## 2020-05-23 NOTE — PROGRESS NOTES
"Courtesy Visit by MOHAMUD    Nancy is a 35 year old  at 35w2d. She was admitted at 33w5d for PPROM and is being expectantly managed.     Met patient to offer social support. Offered to discuss induction process and review patient's labor plan. Pt states, \"My plan is to go into labor naturally. I haven't consented to an induction yet. I'm hoping to wait until my body is ready.\" Validated patient's feelings regarding trusting her body and the labor process. Also reviewed that there are no significant fetal benefits for continuing pregnancy once full term and the need to balance infection risk for her and baby and goals for the labor process.     Pt questions if she starts with induction process and her body is not responding, can she stop and try at a later point or wait for labor. Reviewed that she can always refuse or not consent to medications, but it wouldn't be recommended. Discussed that we would expect a successful IOL.    Answered questions about cervical ripening, when Pitocin is typically used, and fetal monitoring during labor. 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 Discussed birth ball and sling, hydrotherapy, portable monitors, and long IV tubing which help facilitate movement within room.     Pt denies further questions and states she is just trying to take it one day at a time.     CHET Allen, MOHAMUD  "

## 2020-05-23 NOTE — PROGRESS NOTES
Maternal Fetal Medicine Daily Note    S: Overall doing well. No contractions or cramping overnight. Continues to leak clear fluid. No complaints today. Discussed long term plan for delivery     O:  Patient Vitals for the past 24 hrs:   BP Temp Temp src Resp   20 1016 104/68 98  F (36.7  C) Oral 20   20 0554 98/58 97.9  F (36.6  C) Oral 18   20 2245 101/60 98.3  F (36.8  C) Oral 18   20 1952 130/76 98.2  F (36.8  C) Oral 18   20 1428 97/56 98.5  F (36.9  C) Oral 18     Gen - appears well  Abd - soft, no fundal tenderness   Ext - soft     FHT - 140, mod harpreet, +accels, no decels  [place] - no contractions    Assessment: Nancy Shoemaker is a 35 year old  @ 35w3d by LMP c/w 13w1d US, HD#13 admitted for PPROM at 33w5d. Currently on latency antibiotics.     # PPROM  - s/p 7d latency antibiotics.   - S/p BMZ -  - S/p NICU consult  - Risks of expectant management of late  PPROM discussed. Patient was extensively counseled that PPROM has traditionally been expectant management and induction of labor at 34 weeks. There have been multiple RCTs in with increasing evidence for expectant management until 36 weeks 6 days. She is currently a good candidate for this given that her PPROM was so near to 34 weeks. Discussed with expectant management until 36 weeks 6 days she would be at increased risk for maternal infection but that studies did not have increased risk of  sepsis due to short latency (2-3 days) in studies reported, but had decreased respiratory complications. Patient desires further expectant management until delivery indicated or 36+6 weeks. Discussed that delivery may be indicated before this time if signs of PTL, abruption, fetal distress, or infection occur. Patient understands this.   -No signs or symptoms of infection  -Desires expectant management until 36w6d      #Elevated BP x1  - continue to monitor, HELLP labs if second BP     # FWB  - Reassuring FHT,  continue TID NSTs  - Growth US (5/12) 47th percentile   - BPP (5/19) 8/8, MVP 3.8cm, cephalic  - Twice weekly BPPs (Tuesday, Friday)     # PNC  - Rh negative, s/p Rhogam 5/13, ROBERTA screen neg  - Allergy to Tdap.   - GCT 80, RPR NR, Rubella immune, A1c 5.5%, HBsAg negative, HIV neg, HCV neg.  - GBS Negative 5/11     # Delivery planning  - Delivery with Midwives if vaginal delivery, now > 34 weeks.  - Cephalic on 5/19. Will recheck presentation before delivery  - Reviewed indications for a CS including abruption, fetal distress, malpresentation in labor. Discussed risks including bleeding, infection, damage to surrounding organs. Patient agrees to blood transfusion if necessary. Consent signed 5/2.    Physician Attestation   Date of Service (when I saw the patient): 05/23/20    Time Spent on this Encounter   I, Norris Willis, spent a total of 15 minutes bedside and on the inpatient unit today managing the care of Nancy Shoemaker.  Over 50% of my time on the unit was spent counseling the patient and /or coordinating care regarding expectant management of PROM after 34 weeks. See note for details.

## 2020-05-23 NOTE — PLAN OF CARE
Pt states is comfortable. Ambulating around ante [place]. Denies feeling ctx's or bleeding. Shower linen changed. Saline locked flushed.

## 2020-05-23 NOTE — PLAN OF CARE
Patient's VSS throughout the night. Patient rested well throughout the night. Denies any contractions or cramping. Continues to be afebrile. Continues to leak a small amount of clear fluid. FHR monitors applied. No concerns at this time.

## 2020-05-24 PROCEDURE — 12000001 ZZH R&B MED SURG/OB UMMC

## 2020-05-24 PROCEDURE — 25000132 ZZH RX MED GY IP 250 OP 250 PS 637: Performed by: STUDENT IN AN ORGANIZED HEALTH CARE EDUCATION/TRAINING PROGRAM

## 2020-05-24 RX ADMIN — PRENATAL VITAMINS-IRON FUMARATE 27 MG IRON-FOLIC ACID 0.8 MG TABLET 1 TABLET: at 09:41

## 2020-05-24 RX ADMIN — ACYCLOVIR 400 MG: 200 CAPSULE ORAL at 19:55

## 2020-05-24 RX ADMIN — ACYCLOVIR 400 MG: 200 CAPSULE ORAL at 09:41

## 2020-05-24 RX ADMIN — ACYCLOVIR 400 MG: 200 CAPSULE ORAL at 14:07

## 2020-05-24 NOTE — PLAN OF CARE
Pt comfortable, afebrile. No complains. Baby reactive not ctx's on monitor. Leaking small amount of clear fluid. Saline locked flushed.

## 2020-05-24 NOTE — PLAN OF CARE
Patient continues to be afebrile. Other vitals stable. Denies cramping or contractions. No concerns at this time.

## 2020-05-24 NOTE — PROGRESS NOTES
Maternal Fetal Medicine Daily Note    S: Overall doing well. No contractions or cramping overnight. Continues to leak clear fluid. No complaints today. Discussed long term plan for delivery     O:  Patient Vitals for the past 24 hrs:   BP Temp Temp src Resp   20 0943 103/62 98.1  F (36.7  C) Oral 20   20 0600 (!) 86/51 98  F (36.7  C) Oral 18   20 2202 99/61 98  F (36.7  C) Oral --   20 1958 99/54 97.9  F (36.6  C) Oral 20   20 1405 114/68 97.8  F (36.6  C) Oral 18     Gen - appears well  Abd - soft, no fundal tenderness   Ext - soft     FHT - 135, mod harpreet, +accels, no decels  [place] - no contractions    Assessment: Nancy Shoemaker is a 35 year old  @ 35w4d by LMP c/w 13w1d US, HD#13 admitted for PPROM at 33w5d. Currently on latency antibiotics.     # PPROM  - s/p 7d latency antibiotics.   - S/p BMZ -  - S/p NICU consult  - Risks of expectant management of late  PPROM discussed. Patient was extensively counseled that PPROM has traditionally been expectant management and induction of labor at 34 weeks. There have been multiple RCTs in with increasing evidence for expectant management until 36 weeks 6 days. She is currently a good candidate for this given that her PPROM was so near to 34 weeks. Discussed with expectant management until 36 weeks 6 days she would be at increased risk for maternal infection but that studies did not have increased risk of  sepsis due to short latency (2-3 days) in studies reported, but had decreased respiratory complications. Patient desires further expectant management until delivery indicated or 36+6 weeks. Discussed that delivery may be indicated before this time if signs of PTL, abruption, fetal distress, or infection occur. Patient understands this.   -No signs or symptoms of infection  -Desires expectant management until 36w6d      #Elevated BP x1  - continue to monitor, HELLP labs if second BP     # FWB  - Reassuring FHT,  continue TID NSTs  - Growth US (5/12) 47th percentile   - BPP (5/19) 8/8, MVP 3.8cm, cephalic  - Twice weekly BPPs (Tuesday, Friday)     # PNC  - Rh negative, s/p Rhogam 5/13, ROBERTA screen neg  - Allergy to Tdap.   - GCT 80, RPR NR, Rubella immune, A1c 5.5%, HBsAg negative, HIV neg, HCV neg.  - GBS Negative 5/11     # Delivery planning  - Delivery with Midwives if vaginal delivery, now > 34 weeks.  - Cephalic on 5/19. Will recheck presentation before delivery  - Reviewed indications for a CS including abruption, fetal distress, malpresentation in labor. Discussed risks including bleeding, infection, damage to surrounding organs. Patient agrees to blood transfusion if necessary. Consent signed 5/2.    Physician Attestation   Date of Service (when I saw the patient): 05/24/20    Time Spent on this Encounter   I, Norris Willis, spent a total of 15 minutes bedside and on the inpatient unit today managing the care of Nancy Shoemaker.  Over 50% of my time on the unit was spent counseling the patient and /or coordinating care regarding expectant management of PROM after 34 weeks. See note for details.

## 2020-05-25 PROCEDURE — 86850 RBC ANTIBODY SCREEN: CPT | Performed by: OBSTETRICS & GYNECOLOGY

## 2020-05-25 PROCEDURE — 25000132 ZZH RX MED GY IP 250 OP 250 PS 637: Performed by: STUDENT IN AN ORGANIZED HEALTH CARE EDUCATION/TRAINING PROGRAM

## 2020-05-25 PROCEDURE — 86870 RBC ANTIBODY IDENTIFICATION: CPT | Performed by: OBSTETRICS & GYNECOLOGY

## 2020-05-25 PROCEDURE — 36415 COLL VENOUS BLD VENIPUNCTURE: CPT | Performed by: OBSTETRICS & GYNECOLOGY

## 2020-05-25 PROCEDURE — 86901 BLOOD TYPING SEROLOGIC RH(D): CPT | Performed by: OBSTETRICS & GYNECOLOGY

## 2020-05-25 PROCEDURE — 12000001 ZZH R&B MED SURG/OB UMMC

## 2020-05-25 PROCEDURE — 86900 BLOOD TYPING SEROLOGIC ABO: CPT | Performed by: OBSTETRICS & GYNECOLOGY

## 2020-05-25 RX ADMIN — PRENATAL VITAMINS-IRON FUMARATE 27 MG IRON-FOLIC ACID 0.8 MG TABLET 1 TABLET: at 09:30

## 2020-05-25 RX ADMIN — ACYCLOVIR 400 MG: 200 CAPSULE ORAL at 20:14

## 2020-05-25 RX ADMIN — ACYCLOVIR 400 MG: 200 CAPSULE ORAL at 14:42

## 2020-05-25 RX ADMIN — ACYCLOVIR 400 MG: 200 CAPSULE ORAL at 09:30

## 2020-05-25 NOTE — PROGRESS NOTES
Maternal Fetal Medicine Daily Note    S: Overall doing well. No contractions, bleeding. Normal fetal movement. No change in clear fluid. Has questions about IOL and possibly being discharged for home birth versus staying for induction at the hospital.    O:  Patient Vitals for the past 24 hrs:   BP Temp Temp src Resp   20 0533 104/70 97.6  F (36.4  C) Oral 18   20 2230 115/70 -- -- --   20 1800 -- 97.8  F (36.6  C) Oral --   20 1405 95/50 97.9  F (36.6  C) Oral 18   20 0943 103/62 98.1  F (36.7  C) Oral 20     Gen - appears well  Abd - soft, no fundal tenderness   Ext - soft     FHT - 130 bpm, mod harpreet, +accels, no decels  [place] - no contractions    Assessment: Nancy Shoemaker is a 35 year old  @ 35w5d by LMP c/w 13w1d US, HD#15 admitted for PPROM at 33w5d. Stable.     # PPROM  - s/p 7d latency antibiotics.   - S/p BMZ -  - S/p NICU consult  - Risks of expectant management of late  PPROM discussed. Patient was extensively counseled that PPROM has traditionally been expectant management and induction of labor at 34 weeks. There have been multiple RCTs in with increasing evidence for expectant management until 36 weeks 6 days. She is currently a good candidate for this given that her PPROM was so near to 34 weeks. Discussed with expectant management until 36 weeks 6 days she would be at increased risk for maternal infection but that studies did not have increased risk of  sepsis due to short latency (2-3 days) in studies reported, but had decreased respiratory complications. Patient desires further expectant management until delivery indicated or 36+6 weeks. Discussed that delivery may be indicated before this time if signs of PTL, abruption, fetal distress, or infection occur. Patient understands this.   - Inquired about discharging at 36+6 for IOL at home with CNM. Discussed that she is at increased risk of developing Triple I intrapartum and would require  transfer back to the hospital. She would still like to discuss with [facility] CNM service today.   -No signs or symptoms of infection  -Desires expectant management until 36w6d      #Elevated BP x1: Most recently low-normal range  - continue to monitor, HELLP labs if second BP     # FWB  - Reassuring FHT, continue TID NSTs  - Growth US (5/12) 47th percentile   - BPP (5/19) 8/8, MVP 3.8cm, cephalic  - Twice weekly BPPs (Tuesday, Friday)     # PNC  - Rh negative, s/p Rhogam 5/13, ROBERTA screen neg  - Allergy to Tdap.   - GCT 80, RPR NR, Rubella immune, A1c 5.5%, HBsAg negative, HIV neg, HCV neg.  - GBS Negative 5/11     # Delivery planning  - Delivery with Midwives if vaginal delivery, now > 34 weeks.  - Cephalic on 5/19. Will recheck presentation before delivery  - Reviewed indications for a CS including abruption, fetal distress, malpresentation in labor. Discussed risks including bleeding, infection, damage to surrounding organs. Patient agrees to blood transfusion if necessary. Consent signed 5/2.    Hal Andrew MD, MPH  OBGYN PGY-2  5/25/2020 11:15 AM

## 2020-05-25 NOTE — PLAN OF CARE
Patient VSS and afebrile. Pt. Denies any s/s of PTL, pain or change in condition. Pt. Requesting possible transfer of care to home birth midwife, team to discuss. 's category 1, no ctx noted on monitor, see flowsheet for more details. Patient notified when to call out to RN, verbalized understanding and agreed to plan. Will proceed with ongoing assessment.

## 2020-05-25 NOTE — PROVIDER NOTIFICATION
05/24/20 2152   Provider Notification   Provider Name/Title Dr. Horta   Method of Notification Phone   Request Evaluate - Remote   Notification Reason Fetal Baseline Change;Variability Change     Provider notified of FHR tachycardia for last 10 minutes of monitoring. Provider reviewing strip. Plan per provider to continue monitoring closely and obtain current set of VS.     ADDENEDUM: Pt updated of FHR. Reports good hydration. Pt voided and repositioned. Temp 97.2. RN encouraging continued hydration. Will continue close monitoring and update provider.     ADDENDUM: Phone call received from Dr. Horta @ [phone]. FHR tachycardia resolved with interventions. Okay to remove pt from continuous monitoring.

## 2020-05-25 NOTE — PLAN OF CARE
Pt offering no complaints. VSS, afebrile. Reports active FM this evening. Denies any ctx, cramping, backache, or bleeding. She continues to leak a scant amount of clear fluid. Evening monitoring complete, see flowsheets for details. Period of fetal tachycardia, MD aware, resolved after void and reposition. TOCO quiet. Will continue with current POC and update provider as needed.

## 2020-05-25 NOTE — PLAN OF CARE
Pt reports sleeping well last night. Denies cramping, ctx, bleeding, backache. VSS, afebrile. EFM applied, , moderate variability, accels present, decels absent. TOCO WNL. Pt offers no complaints this morning. Plan to continue with present cares and notify provider as needed.

## 2020-05-26 ENCOUNTER — HOSPITAL ENCOUNTER (INPATIENT)
Dept: ULTRASOUND IMAGING | Facility: CLINIC | Age: 35
End: 2020-05-26
Payer: COMMERCIAL

## 2020-05-26 PROCEDURE — 99231 SBSQ HOSP IP/OBS SF/LOW 25: CPT | Performed by: NURSE PRACTITIONER

## 2020-05-26 PROCEDURE — 25000132 ZZH RX MED GY IP 250 OP 250 PS 637: Performed by: STUDENT IN AN ORGANIZED HEALTH CARE EDUCATION/TRAINING PROGRAM

## 2020-05-26 PROCEDURE — 12000001 ZZH R&B MED SURG/OB UMMC

## 2020-05-26 PROCEDURE — 99207 ZZC CONSULT E&M CHANGED TO SUBSEQUENT LEVEL: CPT | Performed by: NURSE PRACTITIONER

## 2020-05-26 PROCEDURE — 76819 FETAL BIOPHYS PROFIL W/O NST: CPT

## 2020-05-26 RX ADMIN — PRENATAL VITAMINS-IRON FUMARATE 27 MG IRON-FOLIC ACID 0.8 MG TABLET 1 TABLET: at 07:51

## 2020-05-26 RX ADMIN — ACYCLOVIR 400 MG: 200 CAPSULE ORAL at 20:14

## 2020-05-26 RX ADMIN — ACYCLOVIR 400 MG: 200 CAPSULE ORAL at 13:49

## 2020-05-26 RX ADMIN — ACYCLOVIR 400 MG: 200 CAPSULE ORAL at 07:51

## 2020-05-26 ASSESSMENT — MIFFLIN-ST. JEOR: SCORE: 1476.54

## 2020-05-26 NOTE — PROGRESS NOTES
Maternal Fetal Medicine Daily Note    S: Overall doing well. No contractions, bleeding. Normal fetal movement. No change in clear fluid. Planning to discuss possible transfer to home midwife for delivery today. Unable to yesterday due to holiday.     O:  Patient Vitals for the past 24 hrs:   BP Temp Temp src Heart Rate Resp Weight   20 0610 -- -- -- -- -- 70.7 kg (155 lb 14.4 oz)   20 0435 -- 98.1  F (36.7  C) Oral -- 16 --   20 0004 109/74 98  F (36.7  C) Oral -- 16 --   20 1700 115/71 98.3  F (36.8  C) Oral 109 -- --   20 1324 -- 98  F (36.7  C) Oral -- -- --   20 0925 137/76 98.1  F (36.7  C) Oral -- 17 --     Gen - appears well  Abd - soft, no fundal tenderness   Ext - soft     FHT - 135 bpm, mod harpreet, +accels, no decels  [place] - rare contractions     Assessment: Nancy Shoemaker is a 35 year old  @ 35w6d by LMP c/w 13w1d US, HD#16 admitted for PPROM at 33w5d. Stable.     # PPROM  - s/p 7d latency antibiotics.   - S/p BMZ -  - S/p NICU consult  - Risks of expectant management of late  PPROM discussed. Patient was extensively counseled that PPROM has traditionally been expectant management and induction of labor at 34 weeks. There have been multiple RCTs in with increasing evidence for expectant management until 36 weeks 6 days. She is currently a good candidate for this given that her PPROM was so near to 34 weeks. Discussed with expectant management until 36 weeks 6 days she would be at increased risk for maternal infection but that studies did not have increased risk of  sepsis due to short latency (2-3 days) in studies reported, but had decreased respiratory complications. Patient desires further expectant management until delivery indicated or 36+6 weeks. Discussed that delivery may be indicated before this time if signs of PTL, abruption, fetal distress, or infection occur. Patient understands this.   - Inquired about discharging at 36+6 for IOL at  home with CNM. Discussed that she is at increased risk of developing Triple I intrapartum and would require transfer back to the hospital. Planning to discuss with [facility] CNM service today.   -No signs or symptoms of infection  -Desires expectant management until at least 36w6d      #Elevated BP x1: otherwise completely normal   - continue to monitor, HELLP labs if second BP     # FWB  - Reassuring FHT, continue TID NSTs  - Growth US (5/12) 47th percentile   - BPP (5/22) 8/8, MVP 5.7cm, cephalic  - Twice weekly BPPs (Tuesday, Friday)     # PNC  - Rh negative, s/p Rhogam 5/13, ROBERTA screen neg  - Allergy to Tdap.   - GCT 80, RPR NR, Rubella immune, A1c 5.5%, HBsAg negative, HIV neg, HCV neg.  - GBS Negative 5/11  - HSV - continue ppx. BLE prior to delivery.      # Delivery planning  - Delivery with Midwives if vaginal delivery, now > 34 weeks.  - Cephalic on 5/19. Will recheck presentation before delivery  - Reviewed indications for a CS including abruption, fetal distress, malpresentation in labor. Discussed risks including bleeding, infection, damage to surrounding organs. Patient agrees to blood transfusion if necessary. Consent signed 5/2.    Nichole Jackson MD

## 2020-05-26 NOTE — PROGRESS NOTES
Nancy requested CNM visit to discuss plan of care. Pt notified [facility] recently that she is considering transferring care back to her home birth midwife. Pt states this midwife would possibly accept her back if the [facility] MDs and/or CNMs would be supportive and accommodate this.     Discussed that the [facility] CNMs could not support a decision to discharge to expectant management for a home birth. Reviewed that our medical advice would be to have an induction of labor during this admission. Reviewed she is at an increased risk for triple I.     CNM questioned trepidation to induction or hospital birth. Pt states she feels ok about doing a hospital birth with the CNMs here because they have emphasized a willingness to support physiologic birth, but she just can't see herself feeling comfortable with induction. She strongly believes that it is in her and her baby's best interest to wait until labor comes naturally. Pt became teary and states she is not trying to put her baby at risk. States she knows this may be many more weeks and just doesn't know if she can be inpatient that long- missing family and being outside.     Nancy questioned option to leave AMA and if she could still have clinic prenatal care and ultrasounds until labor. Discussed that our clinic would not recommend this, but would continue to see her for prenatal care appts, BPPs, and any other care recommended by [facility] until birth.     Pt states that her home birth midwife likely would not allow her to transfer care back without our support. Pt verbalizes that she will probably have to compromise doing her home birth, but can not willing compromise a natural labor. States she will plan to be induced at first sign of infection or fetal indication. Discussed that if she were to develop Triple I this could affect the labor process by making contractions less effective or her coping skills while ill.     Reiterated importance of patient sharing her thoughts, feelings,  and goals regarding the pregnancy and birth process. Reinforced that it is her decision to make, but our CNM team has to recommend what is best supported by evidence to be safest for her and baby. Patient denied further questions and verbalizes appreciation for care she is receiving.     CHET Allen, CNM

## 2020-05-26 NOTE — PLAN OF CARE
Patient VSS and afebrile, Patient denies any S/S of PTL, pain or change in condition. Pt. Discussing birth plan with CNM, to see NNP to discuss potential care needs for infant following delivery. 's cat 1. Irregular ctx noted on toco, pt unaware, provider notified. Patient notified when to call out to RN, Verbalized understanding and agreed to plan. Will proceed with ongoing assessment.

## 2020-05-26 NOTE — PLAN OF CARE
Patient stable this shift.  VSS.  Continues to leak small amount of clear fluid, denies cramping/tatyana or vaginal bleeding. See flow sheet for FHR and contraction pattern.  Offers no complaints.  Continue with routine PPROM cares and will notify provider if there is a change in status.

## 2020-05-26 NOTE — PLAN OF CARE
VSS. Afebrile. Stable. Pt slept comfortably throughout the night. Reported leaking small amt of clear fluid overnight. Pt denies cramping/tightening. See flowsheets for EFM/TOCO. Will continue to monitor.

## 2020-05-26 NOTE — CONSULTS
_       [facility]                      Neonatology Advanced Practice Antepartum Counseling Consult    I was asked to provide additional antepartum counseling for Nancy Shoemaker at the request of Norris Willis MD secondary to PPROM. Ms. Shoemaker is currently 35w6d and was consulted on admission at 33w5d. Ms. Shoemaker requested additional information and expected hospital course of infant now almost 36 weeks. Betamethasone was administered previously; see MAR. Ms. Please feel free to call with any additional questions or concerns.          CATHERINE Mcneal   Advanced Practice Service    Intensive Care Unit  [facility]      Floor Time (min): 5  Face to Face Time (min): 15  Total Time (minutes): 20  More than 50% of my time was spent in direct, face to face, antepartum counseling with the above patient.    CATHERINE Mcneal, [facility], 2020 3:05 PM  [facility]

## 2020-05-26 NOTE — PROVIDER NOTIFICATION
05/26/20 1240   Provider Notification   Provider Name/Title Contag and team   Method of Notification In Department   Request Evaluate - Remote   Notification Reason Uterine Activity   Providers notified of increased uterine activity noted on monitor, Pt denies any awareness of ctx or change in condition. Will proceed with ongoing assessment.

## 2020-05-26 NOTE — PROVIDER NOTIFICATION
05/26/20 0745   Provider Notification   Provider Name/Title Contag and team   Method of Notification At Bedside   Request Evaluate in Person   Notification Reason Status Update   Providers at bedside to evaluate in person. Pt denies any change of condition, S/S of PTL or infection. Plan for pt to discuss expectant management and birth options with [facility] midwife today. Will proceed with ongoing assessment.

## 2020-05-27 PROCEDURE — 25000132 ZZH RX MED GY IP 250 OP 250 PS 637: Performed by: STUDENT IN AN ORGANIZED HEALTH CARE EDUCATION/TRAINING PROGRAM

## 2020-05-27 PROCEDURE — 12000001 ZZH R&B MED SURG/OB UMMC

## 2020-05-27 RX ADMIN — PRENATAL VITAMINS-IRON FUMARATE 27 MG IRON-FOLIC ACID 0.8 MG TABLET 1 TABLET: at 10:32

## 2020-05-27 RX ADMIN — ACYCLOVIR 400 MG: 200 CAPSULE ORAL at 20:55

## 2020-05-27 RX ADMIN — ACYCLOVIR 400 MG: 200 CAPSULE ORAL at 14:37

## 2020-05-27 RX ADMIN — ACYCLOVIR 400 MG: 200 CAPSULE ORAL at 10:32

## 2020-05-27 NOTE — PROGRESS NOTES
"Maternal Fetal Medicine   Progress Note     S: Nancy is doing well. No contractions or bleeding. Normal fetal movement. No change in clear fluid. Nancy states that she is not sure she would like to proceed with a planned induction of labor at 37 weeks gestation and would like to plan for a home birth with her midwife.     O: /65   Pulse 117   Temp 98.1  F (36.7  C) (Oral)   Resp 16   Ht 1.753 m (5' 9\")   Wt 70.7 kg (155 lb 14.4 oz)   BMI 23.02 kg/m      Gen - appears well  Abd - soft, no fundal tenderness   Ext - soft     FHT - 125 bpm, mod harpreet, +accels, no decels  [place] - occasional contractions     Assessment: Nancy Shoemaker is a 35 year old  @ 36w0d by LMP c/w 13w1d US, HD#17 admitted for PPROM at 33w5d. Stable.     # PPROM  - s/p 7d latency antibiotics.   - S/p BMZ -  - S/p NICU consult  -No signs or symptoms of infection  - was admitted and counseled regarding the risk and benefits of late  expectant management until 36w6d with plan induction at 37 weeks ( see prior notes for full documentation of counseling). She now states that if she does not goes into spontaneous labor she would decline induction of labor as she feels this would increases her risk for  section and is not comfortable with a plan induction. She has been communicating with her prior midwife and states that she was going to discuss management plan with us. We explained that at this point continuing expectant management after 37 weeks is beyond standard of care and this will put her in increase maternal and fetal/ risk that should be strongly considered. We discussed this risk today including: intraamniotic infection, maternal and  sepsis, increase risk for  section and complication for this, cord prolapse, stillbirth and postpartum or antepartum hemorrhage. We discussed that all these risk are there reason we will not endorse continue expectant management after 37 weeks and " will recommend induction at 37 weeks. We explained that induction at term in patients that are PPROM have shown to decrease the risk of  section, severe maternal and  infection. Hospital birth would help allow adequate and timely risk management interventions.   After our conversation we encourage her consider our proposed management with induction of labor at 37 weeks. We also clearly stated that if she declines this option and leave she will need to sign leaving against medical advise. We explained that we still would accept continuing her care if she returns for delivery.      #Elevated BP x1: otherwise completely normal   - continue to monitor, HELLP labs if second BP     # FWB  - Reassuring FHT, continue TID NSTs  - Growth US () 47th percentile   - BPP () , MVP 5.7cm, cephalic  - Twice weekly BPPs (Tuesday, Friday)     # PNC  - Rh negative, s/p Rhogam , ROBERTA screen neg  - Allergy to Tdap.   - GCT 80, RPR NR, Rubella immune, A1c 5.5%, HBsAg negative, HIV neg, HCV neg.  - GBS Negative   - HSV - continue ppx. BLE prior to delivery.      # Delivery planning  - Delivery with Midwives if vaginal delivery, now > 34 weeks.  - Cephalic on . Will recheck presentation before delivery  - Reviewed indications for a CS including abruption, fetal distress, malpresentation in labor. Discussed risks including bleeding, infection, damage to surrounding organs. Patient agrees to blood transfusion if necessary. Consent signed .    Adria Restrepo MD  MFM Fellow   2020

## 2020-05-27 NOTE — PLAN OF CARE
Patient stable this shift.  VSS.  Continues to leak small amount of clear fluid, denies any vaginal bleeding. See flow sheet for FHR and contraction pattern.  TOCO shows contractions with irritability but patient denies feeling contractions.  States she feels occasional tightening.  Offers no complaints.  Continue with routine PPROM cares and will notify provider if there is a change in status.

## 2020-05-27 NOTE — PLAN OF CARE
Continues to leak a small amount of clear amniotic fluid. VSS and afebrile. FHT's 135 with moderate variability and accelerations and no decelerations noted. Up in the halls ambulating with mask on. Still uncertain about IOL. Offers no complaints. Stable condition.

## 2020-05-27 NOTE — PLAN OF CARE
VSS. Afebrile. Stable. Pt slept comfortably throughout the night. Reported leaking small amt of clear fluid overnight. Pt reported feeling some cramping/tightening and denies bleeding. See flowsheets for EFM/TOCO. Will continue to monitor.

## 2020-05-27 NOTE — PROGRESS NOTES
Provided patient with information about the management of prelabor  rupture of membranes from ACOG. Practice bulletin 217    Adria Restrepo MD  [facility] Fellow     2020

## 2020-05-27 NOTE — PROGRESS NOTES
Nancy Shoemaker      MRN#: [id number]  Age: 35 year old      YOB: 1985      CNM Social Rounds    - Attended [facility] morning rounds to participate in plan of care discussion.  Pt and MFM team discussed risk/benefit of pt's desire for discharge home undelivered to resume care with home birth team by 37 weeks.  Participated in social support and reviewed that many times pts who have high risk pregnancies chose to transfer to hospital based CNM care before labor starts to avoid transfer while laboring.   Pt is tearful during discussion but coping well overall.  Pt states that her planned home birth midwife Gee Jose would like to discuss plan with [facility] CNM team, this writer will reach out to her today and follow up.   Reviewed continued CNM social rounds/involvement while ante under [facility] cares. Pt verbalized understanding of CNM social role.   Pt has no additional unidentified unmet needs at this time.  Doris ROSENBERG, CNM      ADDENDUM 2020 10:46 AM  - Spoke with Gee Garcia the patient's previous prenatal provider with whom she was planning a home birth before PPROM.   At this point Gee is considering accepting pt back into her prenatal cares if pt leaves AMA from hospital if pt consents to Home IOL at 37 weeks.  Reinforced [facility] and [facility] CNM recommendation for hospital based IOL at 37 weeks and will not medically discharge pt.  Pt can choose to leave AMA. Reinforced that should pt change her mind and prefer hospital based IOL at 37 weeks can come back and if clinically appropriate be cared for by [facility] CNM team in labor.  Reinforced consultation during prenatal course and labor as is previously agreed upon with Protocol between [facility] team and [facility] midwifery teams and team could request transfer for complications or concerns during labor.  Reinforced pt's autonomous decision will be honored as she has been extensively counseled on risks associated with continuation of pregnancy  after 37 weeks and out of hospital birth with PPROM including risk of  sepsis even without change in maternal clinical status . Brought up concern to Gee that if pt decides again at home IOL there is no evidence based approach to  surveillance and if consulted IIOL would be recommended.  Midwife Jose will discuss with pt and follow back up with [facility] CNM team to discuss plan of care. CHET ManriqueM

## 2020-05-28 PROCEDURE — 25000132 ZZH RX MED GY IP 250 OP 250 PS 637: Performed by: STUDENT IN AN ORGANIZED HEALTH CARE EDUCATION/TRAINING PROGRAM

## 2020-05-28 PROCEDURE — 12000001 ZZH R&B MED SURG/OB UMMC

## 2020-05-28 RX ADMIN — ACYCLOVIR 400 MG: 200 CAPSULE ORAL at 14:32

## 2020-05-28 RX ADMIN — ACYCLOVIR 400 MG: 200 CAPSULE ORAL at 20:16

## 2020-05-28 RX ADMIN — ACYCLOVIR 400 MG: 200 CAPSULE ORAL at 09:22

## 2020-05-28 RX ADMIN — PRENATAL VITAMINS-IRON FUMARATE 27 MG IRON-FOLIC ACID 0.8 MG TABLET 1 TABLET: at 09:22

## 2020-05-28 NOTE — PROGRESS NOTES
"Maternal Fetal Medicine   Progress Note     S: Nancy is doing well. No contractions or bleeding. Normal fetal movement. No change in clear fluid. Nancy states that she is not sure she would like to proceed with a planned induction of labor at 37 weeks gestation and would like to plan for a home birth with her midwife. The CNM service here at [facility] discussed this with PCP and will review with patient.    O: /61   Pulse 117   Temp 98.6  F (37  C) (Oral)   Resp 16   Ht 1.753 m (5' 9\")   Wt 70.7 kg (155 lb 14.4 oz)   BMI 23.02 kg/m      Gen - appears well  Abd - soft, no fundal tenderness   Ext - soft     FHT - 130 bpm, mod harpreet, +accels, no decels  [place] - occasional contractions     Assessment: Nancy Shoemaker is a 35 year old  @ 36w1d by LMP c/w 13w1d US, HD#18 admitted for PPROM at 33w5d. Stable.     # PPROM  - s/p 7d latency antibiotics.   - S/p BMZ -  - S/p NICU consult  -No signs or symptoms of infection  - was admitted and counseled regarding the risk and benefits of late  expectant management until 36w6d with plan induction at 37 weeks ( see prior notes for full documentation of counseling). She now states that if she does not goes into spontaneous labor she would decline induction of labor as she feels this would increases her risk for  section and is not comfortable with a plan induction.     She has been communicating with her prior midwife and states that she was going to discuss management plan with us. That conversation was held yesterday and outcomes are pending. We explained that at this point continuing expectant management after 37 weeks is outside standard of care and this would place her at increased maternal and fetal/ risk for infection. THis is based on data reviewing management of PROM at term. The Term PROM trial which randomized women to immediate or expectant management at term for up to 4 days, showed an increased risk for maternal infection " but no difference in risk for CD. Interestingly there were for  deaths in the expectantly managed group, with although not statistically significant, is clinically very significant. Furthermore, most women managed expectantly either labored or required IOL within 24-48 hours. Based on these findings, ACOG formally recommends IOL after PROM at term and does not support expectant management.    We also discussed the need for  surveillance after birth even if clinical chorioamnionitis does not develop in the mother. This would not be available with birth outside of hospital.     We also reviewed that the risk for requiring a  delivery, postpartum infection and  infection increases significantly if the decision to deliver occurs once the diagnosis of clinical chorioamnionitis is established. The diagnosis of chorioamnionitis would require delivery but it is also associated with increased risk for dysfunctional labor. I have provided Ms. Shoemaker with printed information regarding these associations.    We discussed that based on the reasons stated above, we cannot endorse continue expectant management after 37 weeks and emphatically recommend induction at 37 weeks due concerns for maternal, fetal and  wellbeing. We explained that induction at term in patients that are PPROM have shown to decrease the risk of  section, as well as for severe maternal and  infection.    At this time, hospital birth would help allow adequate and timely risk management interventions for complications associated with  and term PROM. We also discussed that discharge from hospital to home birth would be transfer to a lower level of care which is not consistent with the standard for appropriate OB care, and I have shared that if she decides to leave, it would be against medical advice, and she would be required to sign documentation stating that she understands and accepts the  associated risks.    After our conversation we encourage her consider our proposed management with induction of labor at 37 weeks. We also explained that even if she leaves against medical advice, we would still provide care and be readily available for any maternal or  concerns.      #Elevated BP x1: otherwise completely normal   - continue to monitor, HELLP labs if second BP     # FWB  - Reassuring FHT, continue TID NSTs  - Growth US () 47th percentile   - BPP () , MVP 5.7cm, cephalic  - Twice weekly BPPs (Tuesday, Friday)     # PNC  - Rh negative, s/p Rhogam , ROBERTA screen neg  - Allergy to Tdap.   - GCT 80, RPR NR, Rubella immune, A1c 5.5%, HBsAg negative, HIV neg, HCV neg.  - GBS Negative   - HSV - continue ppx. BLE prior to delivery.      # Delivery planning  - Delivery with Midwives if vaginal delivery, now > 34 weeks.  - Cephalic on . Will recheck presentation before delivery  - Reviewed indications for a CS including abruption, fetal distress, malpresentation in labor. Discussed risks including bleeding, infection, damage to surrounding organs. Patient agrees to blood transfusion if necessary. Consent signed .    Physician Attestation     Norris Willis  Date of Service (when I saw the patient): 20    Time Spent on this Encounter   I, Norris Willis, spent a total of 15 minutes bedside and on the inpatient unit today managing the care of Nancy Shoemaker.  Over 50% of my time on the unit was spent counseling the patient and /or coordinating care regarding recommendations for care. See note for details.

## 2020-05-28 NOTE — PLAN OF CARE
Afebrile. Leaking scant  amounts of clear fluid. Contraction pattern  X2 . Fetal assessment Reactive. No signs and symptoms of infection. Ambulating around antepartum farmer and on birth ball. States is comfortable. Denies bleeding.

## 2020-05-28 NOTE — PLAN OF CARE
Continues to leak a small amount of clear amniotic fluid. VSS and afebrile. Denies bleeding. Having a few mild contractions. FHT's 135 with moderate variability and accelerations. No decelerations noted. Up walking in the halls. Offers no complaints. Very pleasant. Stable condition.

## 2020-05-28 NOTE — PLAN OF CARE
VSS.  AFEBRILE.  ASSESSMENT WNL'S.  OCCASIONAL MILD CONTRACTIONS.  FHT'S 135, WITH MODERATE VARIABILITY ET ACCELS.  NO DECELS NOTED.  LEAKING SCANT AMT OF CLEAR FLUID.  CONTINUE WITH PLAN OF CARE.

## 2020-05-29 ENCOUNTER — HOSPITAL ENCOUNTER (INPATIENT)
Facility: CLINIC | Age: 35
End: 2020-05-29
Admitting: OBSTETRICS & GYNECOLOGY
Payer: COMMERCIAL

## 2020-05-29 ENCOUNTER — TELEPHONE (OUTPATIENT)
Dept: OBGYN | Facility: CLINIC | Age: 35
End: 2020-05-29

## 2020-05-29 VITALS
HEIGHT: 69 IN | HEART RATE: 117 BPM | TEMPERATURE: 97.8 F | SYSTOLIC BLOOD PRESSURE: 100 MMHG | BODY MASS INDEX: 23.09 KG/M2 | RESPIRATION RATE: 20 BRPM | DIASTOLIC BLOOD PRESSURE: 58 MMHG | WEIGHT: 155.9 LBS

## 2020-05-29 PROCEDURE — 25000132 ZZH RX MED GY IP 250 OP 250 PS 637: Performed by: STUDENT IN AN ORGANIZED HEALTH CARE EDUCATION/TRAINING PROGRAM

## 2020-05-29 RX ORDER — ACYCLOVIR 200 MG/1
400 CAPSULE ORAL 3 TIMES DAILY
Qty: 72 CAPSULE | Refills: 0 | Status: ON HOLD | OUTPATIENT
Start: 2020-05-29 | End: 2020-06-09

## 2020-05-29 RX ADMIN — ACYCLOVIR 400 MG: 200 CAPSULE ORAL at 08:18

## 2020-05-29 RX ADMIN — PRENATAL VITAMINS-IRON FUMARATE 27 MG IRON-FOLIC ACID 0.8 MG TABLET 1 TABLET: at 08:18

## 2020-05-29 NOTE — PLAN OF CARE
Data: [facility]  Reason for maternal/fetal assessment per patient is Fluid Leak  . Patient is a . Prenatal record reviewed.      OB History    Para Term  AB Living   2 1 1 0 0 1   SAB TAB Ectopic Multiple Live Births   0 0 0 0 1      # Outcome Date GA Lbr Abe/2nd Weight Sex Delivery Anes PTL Lv   2 Current            1 Term 18 39w0d  2.92 kg (6 lb 7 oz) M Vag-Spont None N CRISTEL      Medical History:   Past Medical History:   Diagnosis Date    Genital HSV 2010   . Gestational Age 36w2d. VSS. Cervix: closed.  Fetal movement present. Patient denies cramping, backache, vaginal discharge, pelvic pressure, UTI symptoms, GI problems, bloody show, vaginal bleeding, edema, headache, visual disturbances, epigastric or URQ pain, abdominal pain,. Action: Verbal consent for EFM. Triage assessment completed. EFM applied for fetal well being. Uterine assessment reveal occasional mild contraction not noticed by patient. Fetal assessment: Presumed adequate fetal oxygenation documented (see flow record). Patient education pamphlets given on fetal movement counts and signs of labor and infection. Patient instructed to report change in fetal movement,  bleeding, abdominal pain, or any concerns related to the pregnancy to her nurse/physician.   Response: Dr. Willis informed of patient and her desire to leave today AMA. AMA forms were signed and witnessed. Discharge orders reviewed. Plan  is to discharge patient upon getting rx filled here. Patient verbalized understanding of education and verbalized agreement with plan. Discharged ambulatory at 1130.

## 2020-05-29 NOTE — PLAN OF CARE
VSS, afebrile. Patient slept well throughout the night with no complaints. Patient continues to leak small amount of clear fluid. See flowsheet for FHR/toco interpretation. Patient experiencing occasional mild contractions, but patient is not feeling them. Continue with current plan of care.

## 2020-05-29 NOTE — PROGRESS NOTES
"Maternal Fetal Medicine   Progress Note     S: Nancy is doing well. No contractions or bleeding. Normal fetal movement. No change in clear fluid. Nancy states that she is not sure she would like to proceed with a planned induction of labor at 37 weeks gestation and today states that she would like to be discharged home, but be allowed to return to [facility] for delivery as her PCP has declined to provide home birth services. The CNM service here at [facility] discussed this with PCP and will reviewed with patient.    O: /58   Pulse 117   Temp 97.8  F (36.6  C) (Oral)   Resp 20   Ht 1.753 m (5' 9\")   Wt 70.7 kg (155 lb 14.4 oz)   BMI 23.02 kg/m      Gen - appears well  Abd - soft, no fundal tenderness   Ext - soft     FHT - 140 bpm, mod harpreet, +accels, no decels  [place] - occasional contractions     Assessment: Nancy Shoemaker is a 35 year old  @ 36w2d by LMP c/w 13w1d US, HD#19 admitted for PPROM at 33w5d. Stable.     # PPROM  - s/p 7d latency antibiotics.   - S/p BMZ -  - S/p NICU consult  -No signs or symptoms of infection  - was admitted and counseled regarding the risk and benefits of late  expectant management until 36w6d with plan induction at 37 weeks ( see prior notes for full documentation of counseling). She now states that if she does not goes into spontaneous labor she would decline induction of labor as she feels this would increases her risk for  section and is not comfortable with a plan induction.     She has been communicating with her prior midwife and states that she was going to discuss management plan with us. That conversation was held yesterday and outcomes are pending. We explained that at this point continuing expectant management after 37 weeks is outside standard of care and this would place her at increased maternal and fetal/ risk for infection. This is based on data reviewing management of PROM at term. The Term PROM trial which randomized women to " immediate or expectant management at term for up to 4 days, showed an increased risk for maternal infection but no difference in risk for CD. Interestingly there were for  deaths in the expectantly managed group, with although not statistically significant, is clinically very significant. Furthermore, most women managed expectantly either labored or required IOL within 24-48 hours. Based on these findings, ACOG formally recommends IOL after PROM at term and does not support expectant management.    We also discussed the need for  surveillance after birth even if clinical chorioamnionitis does not develop in the mother. This would not be available with birth outside of hospital.     We also reviewed that the risk for requiring a  delivery, postpartum infection and  infection increases significantly if the decision to deliver occurs once the diagnosis of clinical chorioamnionitis is established. The diagnosis of chorioamnionitis would require delivery but it is also associated with increased risk for dysfunctional labor. I have provided Ms. Shoemaker with printed information regarding these associations.    We discussed that based on the reasons stated above, we cannot endorse continue expectant management after 37 weeks and emphatically recommend induction at 37 weeks due concerns for maternal, fetal and  wellbeing. We explained that induction at term in patients that are PPROM have shown to decrease the risk of  section, as well as for severe maternal and  infection.    At this time, hospital birth would help allow adequate and timely risk management interventions for complications associated with  and term PROM. We also discussed that discharge from hospital to home birth would be transfer to a lower level of care which is not consistent with the standard for appropriate OB care, and I have shared that if she decides to leave, it would be against medical  advice, and she would be required to sign documentation stating that she understands and accepts the associated risks.    After our conversation we encourage her consider our proposed management with induction of labor at 37 weeks. We also explained that even if she leaves against medical advice, we would still provide care and be readily available for any maternal or  concerns.     After extensive conversation, decision is that patient will leave AMA to home today. She will sign AMA forms with  The understanding of the risks for PROM at term including chorioamnionitis, endometritis, hysterectomy, fetal death, cord prolapse, placental abruption,  sepsis.     She will follow up for surveillance infection screening with CNM service here at [facility]. She has stated her intent to not continue pregnancy past 41 weeks, but may consider earlier induction of labor.     #Elevated BP x1: otherwise completely normal   - continue to monitor, HELLP labs if second BP     # FWB  - Reassuring FHT, continue TID NSTs  - Growth US () 47th percentile   - BPP () , MVP 5.7cm, cephalic  - Twice weekly BPPs (Tuesday, Friday)     # PNC  - Rh negative, s/p Rhogam , ROBERTA screen neg  - Allergy to Tdap.   - GCT 80, RPR NR, Rubella immune, A1c 5.5%, HBsAg negative, HIV neg, HCV neg.  - GBS Negative   - HSV - continue ppx. BLE prior to delivery.      # Delivery planning  - Delivery with Midwives if vaginal delivery, now > 34 weeks.  - Cephalic on . Will recheck presentation before delivery  - Reviewed indications for a CS including abruption, fetal distress, malpresentation in labor. Discussed risks including bleeding, infection, damage to surrounding organs. Patient agrees to blood transfusion if necessary. Consent signed .    Physician Attestation     Norris Willis  Date of Service (when I saw the patient): 20    Time Spent on this Encounter   I, Norris Willis, spent a total of 15 minutes  bedside and on the inpatient unit today managing the care of Nancy Shoemaekr.  Over 50% of my time on the unit was spent counseling the patient and /or coordinating care regarding recommendations for care. See note for details.

## 2020-05-29 NOTE — TELEPHONE ENCOUNTER
Received call from midwife, Noreen Contreras, who asked nurse to call patient and get her set-up with twice weekly midwife visits starting on Monday (Monday/Thursday schedule).    Spoke with Nancy and informed her of need for twice weekly clinic visits. She agreed with plan and was forwarded to  to schedule.

## 2020-06-01 ENCOUNTER — HOSPITAL ENCOUNTER (OUTPATIENT)
Facility: CLINIC | Age: 35
End: 2020-06-01
Attending: ADVANCED PRACTICE MIDWIFE | Admitting: ADVANCED PRACTICE MIDWIFE
Payer: COMMERCIAL

## 2020-06-01 ENCOUNTER — OFFICE VISIT (OUTPATIENT)
Dept: OBGYN | Facility: CLINIC | Age: 35
End: 2020-06-01
Attending: ADVANCED PRACTICE MIDWIFE
Payer: COMMERCIAL

## 2020-06-01 VITALS
HEART RATE: 111 BPM | DIASTOLIC BLOOD PRESSURE: 76 MMHG | TEMPERATURE: 98.5 F | HEIGHT: 69 IN | WEIGHT: 160.5 LBS | SYSTOLIC BLOOD PRESSURE: 107 MMHG | BODY MASS INDEX: 23.77 KG/M2

## 2020-06-01 DIAGNOSIS — O42.919 PRETERM PREMATURE RUPTURE OF MEMBRANES (PPROM) WITH UNKNOWN ONSET OF LABOR: ICD-10-CM

## 2020-06-01 DIAGNOSIS — O09.93 SUPERVISION OF HIGH RISK PREGNANCY IN THIRD TRIMESTER: Primary | ICD-10-CM

## 2020-06-01 LAB
ERYTHROCYTE [DISTWIDTH] IN BLOOD BY AUTOMATED COUNT: 12.9 % (ref 10–15)
HCT VFR BLD AUTO: 36.8 % (ref 35–47)
HGB BLD-MCNC: 12.7 G/DL (ref 11.7–15.7)
MCH RBC QN AUTO: 31.5 PG (ref 26.5–33)
MCHC RBC AUTO-ENTMCNC: 34.5 G/DL (ref 31.5–36.5)
MCV RBC AUTO: 91 FL (ref 78–100)
PLATELET # BLD AUTO: 162 10E9/L (ref 150–450)
RBC # BLD AUTO: 4.03 10E12/L (ref 3.8–5.2)
WBC # BLD AUTO: 9 10E9/L (ref 4–11)

## 2020-06-01 PROCEDURE — 36415 COLL VENOUS BLD VENIPUNCTURE: CPT | Performed by: ADVANCED PRACTICE MIDWIFE

## 2020-06-01 PROCEDURE — 85027 COMPLETE CBC AUTOMATED: CPT | Performed by: ADVANCED PRACTICE MIDWIFE

## 2020-06-01 PROCEDURE — G0463 HOSPITAL OUTPT CLINIC VISIT: HCPCS | Mod: ZF

## 2020-06-01 ASSESSMENT — ANXIETY QUESTIONNAIRES
2. NOT BEING ABLE TO STOP OR CONTROL WORRYING: NOT AT ALL
3. WORRYING TOO MUCH ABOUT DIFFERENT THINGS: NOT AT ALL
6. BECOMING EASILY ANNOYED OR IRRITABLE: NOT AT ALL
7. FEELING AFRAID AS IF SOMETHING AWFUL MIGHT HAPPEN: NOT AT ALL
GAD7 TOTAL SCORE: 1
5. BEING SO RESTLESS THAT IT IS HARD TO SIT STILL: NOT AT ALL
1. FEELING NERVOUS, ANXIOUS, OR ON EDGE: SEVERAL DAYS

## 2020-06-01 ASSESSMENT — PATIENT HEALTH QUESTIONNAIRE - PHQ9
5. POOR APPETITE OR OVEREATING: NOT AT ALL
SUM OF ALL RESPONSES TO PHQ QUESTIONS 1-9: 0

## 2020-06-01 ASSESSMENT — MIFFLIN-ST. JEOR: SCORE: 1497.4

## 2020-06-01 NOTE — PROGRESS NOTES
"Subjective:     35 year old  at 36w5d presents for a routine prenatal appointment.  Denies vaginal bleeding or change in vaginal discharge.  Denies contractions.  + fetal movement.       No HA, visual changes, RUQ or epigastric pain.   Patient concerns: Feeling well overall.    - Pt reports feeling fine. Feeling less stressed at home.    - Denies any abnormal discharge, fevers, etc.    - Questions about fetal monitoring in labor    Reviewed medical, surgical, family, social and OB hx with patient.    Objective:  Vitals:    20 1011   BP: 107/76   BP Location: Left arm   Patient Position: Chair   Pulse: 111   Temp: 98.5  F (36.9  C)   TempSrc: Oral   Weight: 72.8 kg (160 lb 8 oz)   Height: 1.753 m (5' 9\")    See OB flowsheet    Assessment/Plan:  Encounter Diagnoses   Name Primary?      premature rupture of membranes (PPROM) with unknown onset of labor Yes     Supervision of high risk pregnancy in third trimester      Orders Placed This Encounter   Procedures     CBC with Platelets     PHQ-9 SCORE 2020   PHQ-9 Total Score 0     PHQ 2020   PHQ-9 Total Score 0   Q9: Thoughts of better off dead/self-harm past 2 weeks Not at all     GBS screening: negative   Birth preferences reviewed: Unmedicated  Labor signs discussed. Reinforced daily fetal movement counts.  Reviewed why/how to contact provider if headache/visual changes/RUQ or epigastric pain, decreased fetal movement, vaginal bleeding, leakage of fluid.  Reviewed protocol for intermittent auscultation > 37 weeks and ruptured < 24 hours. Discussed recommendation for continuous fetal monitoring d/t prolonged ROM.  Pt counseled on potential risks of prolonged ROM, including maternal/fetal sepsis, bleeding, hysterectomy, etc.   Pt declines IOL at this time  CBC today  Return to clinic in 1 week and prn if questions or concerns.     CHET Boyd CNM  "

## 2020-06-01 NOTE — LETTER
"2020       RE: Nancy Shoemaker  [address]  [facility] [address]     Dear Colleague,    Thank you for referring your patient, Nancy Shoemaker, to the [facility] at [facility]. Please see a copy of my visit note below.    Subjective:     35 year old  at 36w5d presents for a routine prenatal appointment.  Denies vaginal bleeding or change in vaginal discharge.  Denies contractions.  + fetal movement.       No HA, visual changes, RUQ or epigastric pain.   Patient concerns: Feeling well overall.    - Pt reports feeling fine. Feeling less stressed at home.    - Denies any abnormal discharge, fevers, etc.    - Questions about fetal monitoring in labor    Reviewed medical, surgical, family, social and OB hx with patient.    Objective:  Vitals:    20 1011   BP: 107/76   BP Location: Left arm   Patient Position: Chair   Pulse: 111   Temp: 98.5  F (36.9  C)   TempSrc: Oral   Weight: 72.8 kg (160 lb 8 oz)   Height: 1.753 m (5' 9\")    See OB flowsheet    Assessment/Plan:  Encounter Diagnoses   Name Primary?      premature rupture of membranes (PPROM) with unknown onset of labor Yes     Supervision of high risk pregnancy in third trimester      Orders Placed This Encounter   Procedures     CBC with Platelets     PHQ-9 SCORE 2020   PHQ-9 Total Score 0     PHQ 2020   PHQ-9 Total Score 0   Q9: Thoughts of better off dead/self-harm past 2 weeks Not at all     GBS screening: negative   Birth preferences reviewed: Unmedicated  Labor signs discussed. Reinforced daily fetal movement counts.  Reviewed why/how to contact provider if headache/visual changes/RUQ or epigastric pain, decreased fetal movement, vaginal bleeding, leakage of fluid.  Reviewed protocol for intermittent auscultation > 37 weeks and ruptured < 24 hours. Discussed recommendation for continuous fetal monitoring d/t prolonged ROM.  Pt counseled on potential risks of prolonged ROM, " including maternal/fetal sepsis, bleeding, hysterectomy, etc.   Pt declines IOL at this time  CBC today  Return to clinic in 1 week and prn if questions or concerns.     CHET Boyd CNM    Again, thank you for allowing me to participate in the care of your patient.      Sincerely,    CHET Boyd CNM

## 2020-06-01 NOTE — LETTER
Date:Yamilet 3, 2020      Patient was self referred, no letter generated. Do not send.        [facility] Health Information

## 2020-06-01 NOTE — NURSING NOTE
Chief Complaint   Patient presents with     Prenatal Care     36w5d       See DARA Coreas 6/1/2020

## 2020-06-02 ASSESSMENT — ANXIETY QUESTIONNAIRES: GAD7 TOTAL SCORE: 1

## 2020-06-04 ENCOUNTER — OFFICE VISIT (OUTPATIENT)
Dept: OBGYN | Facility: CLINIC | Age: 35
End: 2020-06-04
Attending: ADVANCED PRACTICE MIDWIFE
Payer: COMMERCIAL

## 2020-06-04 VITALS
SYSTOLIC BLOOD PRESSURE: 118 MMHG | HEART RATE: 89 BPM | WEIGHT: 171 LBS | TEMPERATURE: 98.1 F | HEIGHT: 69 IN | DIASTOLIC BLOOD PRESSURE: 77 MMHG | BODY MASS INDEX: 25.33 KG/M2

## 2020-06-04 DIAGNOSIS — O42.919 PRETERM PREMATURE RUPTURE OF MEMBRANES (PPROM) WITH UNKNOWN ONSET OF LABOR: ICD-10-CM

## 2020-06-04 DIAGNOSIS — Z86.19 HISTORY OF HERPES GENITALIS: ICD-10-CM

## 2020-06-04 DIAGNOSIS — O09.93 SUPERVISION OF HIGH RISK PREGNANCY IN THIRD TRIMESTER: Primary | ICD-10-CM

## 2020-06-04 DIAGNOSIS — Z34.93 PREGNANCY WITH PRENATAL CARE ELSEWHERE IN THIRD TRIMESTER: ICD-10-CM

## 2020-06-04 PROBLEM — Z36.89 ENCOUNTER FOR TRIAGE IN PREGNANT PATIENT: Status: RESOLVED | Noted: 2020-05-11 | Resolved: 2020-06-04

## 2020-06-04 PROCEDURE — G0463 HOSPITAL OUTPT CLINIC VISIT: HCPCS | Mod: ZF

## 2020-06-04 ASSESSMENT — MIFFLIN-ST. JEOR: SCORE: 1545.03

## 2020-06-04 ASSESSMENT — PAIN SCALES - GENERAL: PAINLEVEL: NO PAIN (0)

## 2020-06-04 NOTE — PROGRESS NOTES
"Subjective:      35 year old  at 37w1d presents for a routine prenatal appointment to follow up on PPROM >24 hours.        no vaginal bleeding, or change in vaginal discharge. +  leakage of fluid that remains clear,  No malodor or change in color.  no contractions.  + fetal movement.     No HA, visual changes, RUQ or epigastric pain.     Patient concerns:  Feeling well overall.    GBS negative  - plan re screen by 2020 if undelivered   - Agreeable to Saline lock in labor  - Would prefer Hydrotherapy, aware no Waterbirth given ROM >24 hours  - Would prefer not to have continuous EFM monitoring in labor.  Is aware of recommendations, will consider intermittent NST  - Again declines IOL despite strong recommendation including counseling of potential fetal and maternal consequences including fetal sepsis, maternal hemorrhage, and hysterectomy.   - taking daily Acyclovir suppressive dosing, no current outbreak and no prodromal s/s  - Weekly CBC was done on 2020 - WBC was 9.   Agreeable to CBC again next week as recommended.      Objective:  Vitals:    20 1032   BP: 118/77   Pulse: 89   Temp: 98.1  F (36.7  C)   TempSrc: Oral   Weight: 77.6 kg (171 lb)   Height: 1.753 m (5' 9\")    See OB flowsheet    Assessment/Plan     Encounter Diagnoses   Name Primary?     Supervision of high risk pregnancy in third trimester Yes     Pregnancy with prenatal care elsewhere in third trimester       premature rupture of membranes (PPROM) with unknown onset of labor      History of herpes genitalis          PHQ-9 SCORE 2020   PHQ-9 Total Score 0     Reinforced if at any time pt desires IOL can have scheduled - will need repeat COVID testing.     Reinforced daily fetal movement counts.  Reviewed why/how to contact provider if headache/visual changes/RUQ or epigastric pain, decreased fetal movement, vaginal bleeding, leakage of fluid.   Return to clinic in for twice weekly DEMARIO and prn if questions or " concerns.     CHET ManriqueM

## 2020-06-04 NOTE — LETTER
Date:June 8, 2020      Patient was self referred, no letter generated. Do not send.        [facility] Health Information

## 2020-06-04 NOTE — LETTER
"2020       RE: Nancy Shoemaker  [address]  [facility] [address]     Dear Colleague,    Thank you for referring your patient, Nancy Shoemaker, to the [facility] at [facility]. Please see a copy of my visit note below.    Subjective:      35 year old  at 37w1d presents for a routine prenatal appointment to follow up on PPROM >24 hours.        no vaginal bleeding, or change in vaginal discharge. +  leakage of fluid that remains clear,  No malodor or change in color.  no contractions.  + fetal movement.     No HA, visual changes, RUQ or epigastric pain.     Patient concerns:  Feeling well overall.    GBS negative  - plan re screen by 2020 if undelivered   - Agreeable to Saline lock in labor  - Would prefer Hydrotherapy, aware no Waterbirth given ROM >24 hours  - Would prefer not to have continuous EFM monitoring in labor.  Is aware of recommendations, will consider intermittent NST  - Again declines IOL despite strong recommendation including counseling of potential fetal and maternal consequences including fetal sepsis, maternal hemorrhage, and hysterectomy.   - taking daily Acyclovir suppressive dosing, no current outbreak and no prodromal s/s  - Weekly CBC was done on 2020 - WBC was 9.   Agreeable to CBC again next week as recommended.      Objective:  Vitals:    20 1032   BP: 118/77   Pulse: 89   Temp: 98.1  F (36.7  C)   TempSrc: Oral   Weight: 77.6 kg (171 lb)   Height: 1.753 m (5' 9\")    See OB flowsheet    Assessment/Plan     Encounter Diagnoses   Name Primary?     Supervision of high risk pregnancy in third trimester Yes     Pregnancy with prenatal care elsewhere in third trimester       premature rupture of membranes (PPROM) with unknown onset of labor      History of herpes genitalis          PHQ-9 SCORE 2020   PHQ-9 Total Score 0     Reinforced if at any time pt desires IOL can have scheduled - will need repeat " COVID testing.     Reinforced daily fetal movement counts.  Reviewed why/how to contact provider if headache/visual changes/RUQ or epigastric pain, decreased fetal movement, vaginal bleeding, leakage of fluid.   Return to clinic in for twice weekly DEMARIO and prn if questions or concerns.     CHET Manrique CNM    Again, thank you for allowing me to participate in the care of your patient.      Sincerely,    CHET Manrique CNM

## 2020-06-08 ENCOUNTER — HOSPITAL ENCOUNTER (INPATIENT)
Facility: CLINIC | Age: 35
LOS: 1 days | Discharge: HOME HEALTH | End: 2020-06-09
Attending: ADVANCED PRACTICE MIDWIFE | Admitting: ADVANCED PRACTICE MIDWIFE
Payer: COMMERCIAL

## 2020-06-08 LAB
BASOPHILS # BLD AUTO: 0 10E9/L (ref 0–0.2)
BASOPHILS NFR BLD AUTO: 0.2 %
BLOOD BANK CMNT PATIENT-IMP: NORMAL
DIFFERENTIAL METHOD BLD: NORMAL
EOSINOPHIL # BLD AUTO: 0.1 10E9/L (ref 0–0.7)
EOSINOPHIL NFR BLD AUTO: 0.9 %
ERYTHROCYTE [DISTWIDTH] IN BLOOD BY AUTOMATED COUNT: 13.3 % (ref 10–15)
HCT VFR BLD AUTO: 37.3 % (ref 35–47)
HGB BLD-MCNC: 12.3 G/DL (ref 11.7–15.7)
IMM GRANULOCYTES # BLD: 0 10E9/L (ref 0–0.4)
IMM GRANULOCYTES NFR BLD: 0.2 %
LYMPHOCYTES # BLD AUTO: 2.2 10E9/L (ref 0.8–5.3)
LYMPHOCYTES NFR BLD AUTO: 23.2 %
MCH RBC QN AUTO: 30.7 PG (ref 26.5–33)
MCHC RBC AUTO-ENTMCNC: 33 G/DL (ref 31.5–36.5)
MCV RBC AUTO: 93 FL (ref 78–100)
MONOCYTES # BLD AUTO: 0.7 10E9/L (ref 0–1.3)
MONOCYTES NFR BLD AUTO: 6.8 %
NEUTROPHILS # BLD AUTO: 6.5 10E9/L (ref 1.6–8.3)
NEUTROPHILS NFR BLD AUTO: 68.7 %
NRBC # BLD AUTO: 0 10*3/UL
NRBC BLD AUTO-RTO: 0 /100
PLATELET # BLD AUTO: 190 10E9/L (ref 150–450)
RBC # BLD AUTO: 4.01 10E12/L (ref 3.8–5.2)
SARS-COV-2 PCR COMMENT: NORMAL
SARS-COV-2 RNA SPEC QL NAA+PROBE: NEGATIVE
SARS-COV-2 RNA SPEC QL NAA+PROBE: NORMAL
SPECIMEN SOURCE: NORMAL
SPECIMEN SOURCE: NORMAL
T PALLIDUM AB SER QL: NONREACTIVE
WBC # BLD AUTO: 9.5 10E9/L (ref 4–11)

## 2020-06-08 PROCEDURE — U0003 INFECTIOUS AGENT DETECTION BY NUCLEIC ACID (DNA OR RNA); SEVERE ACUTE RESPIRATORY SYNDROME CORONAVIRUS 2 (SARS-COV-2) (CORONAVIRUS DISEASE [COVID-19]), AMPLIFIED PROBE TECHNIQUE, MAKING USE OF HIGH THROUGHPUT TECHNOLOGIES AS DESCRIBED BY CMS-2020-01-R: HCPCS | Performed by: ADVANCED PRACTICE MIDWIFE

## 2020-06-08 PROCEDURE — 12000001 ZZH R&B MED SURG/OB UMMC

## 2020-06-08 PROCEDURE — 88307 TISSUE EXAM BY PATHOLOGIST: CPT | Mod: 26 | Performed by: ADVANCED PRACTICE MIDWIFE

## 2020-06-08 PROCEDURE — 72200001 ZZH LABOR CARE VAGINAL DELIVERY SINGLE

## 2020-06-08 PROCEDURE — 86780 TREPONEMA PALLIDUM: CPT | Performed by: ADVANCED PRACTICE MIDWIFE

## 2020-06-08 PROCEDURE — G0463 HOSPITAL OUTPT CLINIC VISIT: HCPCS

## 2020-06-08 PROCEDURE — 88307 TISSUE EXAM BY PATHOLOGIST: CPT | Performed by: ADVANCED PRACTICE MIDWIFE

## 2020-06-08 PROCEDURE — 25000132 ZZH RX MED GY IP 250 OP 250 PS 637: Performed by: ADVANCED PRACTICE MIDWIFE

## 2020-06-08 PROCEDURE — 25000125 ZZHC RX 250: Performed by: ADVANCED PRACTICE MIDWIFE

## 2020-06-08 PROCEDURE — 85025 COMPLETE CBC W/AUTO DIFF WBC: CPT | Performed by: ADVANCED PRACTICE MIDWIFE

## 2020-06-08 RX ORDER — ACETAMINOPHEN 325 MG/1
650 TABLET ORAL EVERY 4 HOURS PRN
Status: DISCONTINUED | OUTPATIENT
Start: 2020-06-08 | End: 2020-06-09 | Stop reason: HOSPADM

## 2020-06-08 RX ORDER — AMOXICILLIN 250 MG
2 CAPSULE ORAL 2 TIMES DAILY
Status: DISCONTINUED | OUTPATIENT
Start: 2020-06-08 | End: 2020-06-09 | Stop reason: HOSPADM

## 2020-06-08 RX ORDER — OXYTOCIN/0.9 % SODIUM CHLORIDE 30/500 ML
100 PLASTIC BAG, INJECTION (ML) INTRAVENOUS CONTINUOUS
Status: DISCONTINUED | OUTPATIENT
Start: 2020-06-08 | End: 2020-06-09 | Stop reason: HOSPADM

## 2020-06-08 RX ORDER — LIDOCAINE HYDROCHLORIDE 10 MG/ML
INJECTION, SOLUTION EPIDURAL; INFILTRATION; INTRACAUDAL; PERINEURAL
Status: DISCONTINUED
Start: 2020-06-08 | End: 2020-06-08 | Stop reason: WASHOUT

## 2020-06-08 RX ORDER — IBUPROFEN 800 MG/1
800 TABLET, FILM COATED ORAL EVERY 6 HOURS PRN
Status: DISCONTINUED | OUTPATIENT
Start: 2020-06-08 | End: 2020-06-09 | Stop reason: HOSPADM

## 2020-06-08 RX ORDER — ACETAMINOPHEN 325 MG/1
650 TABLET ORAL EVERY 4 HOURS PRN
Status: DISCONTINUED | OUTPATIENT
Start: 2020-06-08 | End: 2020-06-08

## 2020-06-08 RX ORDER — METHYLERGONOVINE MALEATE 0.2 MG/ML
200 INJECTION INTRAVENOUS
Status: DISCONTINUED | OUTPATIENT
Start: 2020-06-08 | End: 2020-06-08

## 2020-06-08 RX ORDER — OXYCODONE AND ACETAMINOPHEN 5; 325 MG/1; MG/1
1 TABLET ORAL
Status: DISCONTINUED | OUTPATIENT
Start: 2020-06-08 | End: 2020-06-08

## 2020-06-08 RX ORDER — OXYTOCIN/0.9 % SODIUM CHLORIDE 30/500 ML
100-340 PLASTIC BAG, INJECTION (ML) INTRAVENOUS CONTINUOUS PRN
Status: DISCONTINUED | OUTPATIENT
Start: 2020-06-08 | End: 2020-06-08

## 2020-06-08 RX ORDER — MISOPROSTOL 200 UG/1
400 TABLET ORAL
Status: DISCONTINUED | OUTPATIENT
Start: 2020-06-08 | End: 2020-06-09 | Stop reason: HOSPADM

## 2020-06-08 RX ORDER — SODIUM CHLORIDE, SODIUM LACTATE, POTASSIUM CHLORIDE, CALCIUM CHLORIDE 600; 310; 30; 20 MG/100ML; MG/100ML; MG/100ML; MG/100ML
INJECTION, SOLUTION INTRAVENOUS CONTINUOUS
Status: DISCONTINUED | OUTPATIENT
Start: 2020-06-08 | End: 2020-06-08

## 2020-06-08 RX ORDER — BISACODYL 10 MG
10 SUPPOSITORY, RECTAL RECTAL DAILY PRN
Status: DISCONTINUED | OUTPATIENT
Start: 2020-06-10 | End: 2020-06-09 | Stop reason: HOSPADM

## 2020-06-08 RX ORDER — OXYTOCIN/0.9 % SODIUM CHLORIDE 30/500 ML
PLASTIC BAG, INJECTION (ML) INTRAVENOUS
Status: DISCONTINUED
Start: 2020-06-08 | End: 2020-06-08 | Stop reason: HOSPADM

## 2020-06-08 RX ORDER — HYDROCORTISONE 2.5 %
CREAM (GRAM) TOPICAL 3 TIMES DAILY PRN
Status: DISCONTINUED | OUTPATIENT
Start: 2020-06-08 | End: 2020-06-09 | Stop reason: HOSPADM

## 2020-06-08 RX ORDER — OXYTOCIN 10 [USP'U]/ML
10 INJECTION, SOLUTION INTRAMUSCULAR; INTRAVENOUS
Status: DISCONTINUED | OUTPATIENT
Start: 2020-06-08 | End: 2020-06-09 | Stop reason: HOSPADM

## 2020-06-08 RX ORDER — ONDANSETRON 2 MG/ML
4 INJECTION INTRAMUSCULAR; INTRAVENOUS EVERY 6 HOURS PRN
Status: DISCONTINUED | OUTPATIENT
Start: 2020-06-08 | End: 2020-06-08

## 2020-06-08 RX ORDER — OXYTOCIN 10 [USP'U]/ML
10 INJECTION, SOLUTION INTRAMUSCULAR; INTRAVENOUS
Status: DISCONTINUED | OUTPATIENT
Start: 2020-06-08 | End: 2020-06-08

## 2020-06-08 RX ORDER — OXYTOCIN 10 [USP'U]/ML
INJECTION, SOLUTION INTRAMUSCULAR; INTRAVENOUS
Status: DISCONTINUED
Start: 2020-06-08 | End: 2020-06-08 | Stop reason: WASHOUT

## 2020-06-08 RX ORDER — MODIFIED LANOLIN
OINTMENT (GRAM) TOPICAL
Status: DISCONTINUED | OUTPATIENT
Start: 2020-06-08 | End: 2020-06-09 | Stop reason: HOSPADM

## 2020-06-08 RX ORDER — NALOXONE HYDROCHLORIDE 0.4 MG/ML
.1-.4 INJECTION, SOLUTION INTRAMUSCULAR; INTRAVENOUS; SUBCUTANEOUS
Status: DISCONTINUED | OUTPATIENT
Start: 2020-06-08 | End: 2020-06-09 | Stop reason: HOSPADM

## 2020-06-08 RX ORDER — NALOXONE HYDROCHLORIDE 0.4 MG/ML
.1-.4 INJECTION, SOLUTION INTRAMUSCULAR; INTRAVENOUS; SUBCUTANEOUS
Status: DISCONTINUED | OUTPATIENT
Start: 2020-06-08 | End: 2020-06-08

## 2020-06-08 RX ORDER — IBUPROFEN 800 MG/1
800 TABLET, FILM COATED ORAL
Status: DISCONTINUED | OUTPATIENT
Start: 2020-06-08 | End: 2020-06-08

## 2020-06-08 RX ORDER — AMOXICILLIN 250 MG
1 CAPSULE ORAL 2 TIMES DAILY
Status: DISCONTINUED | OUTPATIENT
Start: 2020-06-08 | End: 2020-06-09 | Stop reason: HOSPADM

## 2020-06-08 RX ORDER — MISOPROSTOL 200 UG/1
TABLET ORAL
Status: DISCONTINUED
Start: 2020-06-08 | End: 2020-06-08 | Stop reason: HOSPADM

## 2020-06-08 RX ORDER — OXYTOCIN/0.9 % SODIUM CHLORIDE 30/500 ML
340 PLASTIC BAG, INJECTION (ML) INTRAVENOUS CONTINUOUS PRN
Status: DISCONTINUED | OUTPATIENT
Start: 2020-06-08 | End: 2020-06-09 | Stop reason: HOSPADM

## 2020-06-08 RX ORDER — CARBOPROST TROMETHAMINE 250 UG/ML
250 INJECTION, SOLUTION INTRAMUSCULAR
Status: DISCONTINUED | OUTPATIENT
Start: 2020-06-08 | End: 2020-06-08

## 2020-06-08 RX ADMIN — IBUPROFEN 800 MG: 800 TABLET, FILM COATED ORAL at 20:16

## 2020-06-08 RX ADMIN — OXYTOCIN-SODIUM CHLORIDE 0.9% IV SOLN 30 UNIT/500ML 340 ML/HR: 30-0.9/5 SOLUTION at 06:40

## 2020-06-08 ASSESSMENT — MIFFLIN-ST. JEOR: SCORE: 1499.67

## 2020-06-08 NOTE — PLAN OF CARE
Data: Patient presented to [facility] at 0435.   Reason for maternal/fetal assessment per patient is Rule Out Labor  .  Patient is a . Prenatal record reviewed.      OB History    Para Term  AB Living   2 1 1 0 0 1   SAB TAB Ectopic Multiple Live Births   0 0 0 0 1      # Outcome Date GA Lbr Abe/2nd Weight Sex Delivery Anes PTL Lv   2 Current            1 Term 18 39w0d  2.92 kg (6 lb 7 oz) M Vag-Spont None N CRISTEL      Name: Levi      Obstetric Comments   Denies GDM, HTN, PPH.   . Medical history:   Past Medical History:   Diagnosis Date    Genital HSV 2010   . Gestational Age 37w5d. VSS. Fetal movement present. Patient denies  pelvic pressure, UTI symptoms, GI problems, bloody show, vaginal bleeding, edema, headache, visual disturbances, epigastric or URQ pain. Support person is present.  Action: Verbal consent for EFM. Triage assessment completed. EFM applied. Uterine assessment complete. Fetal assessment: Presumed adequate fetal oxygenation documented (see flow record).   Response: RON Domínguez CNM informed of arrival. Plan per provider is admission for labor. Patient verbalized agreement with plan.

## 2020-06-08 NOTE — PLAN OF CARE
Pt stable immediately postpartum. Declines needs for pain meds at this time. Up to void without difficulty. Transfer to pp via wc, bands checked and report given to A Patricio RN who assumed care.

## 2020-06-08 NOTE — L&D DELIVERY NOTE
CNM Delivery Note  Labor Course: mild contx started on  at 2000, slept on and off, contx reg and stronger at 0330, arrived to hosp at 0500, urge to push to on toilet, birthed standing by the side of the bed, passed baby through legs to mom, placenta delivered in bed. Declined pit IM, then some clots and gushes at 30 min pp, started IV pit    Pain meds: none  IUP at 37 weeks gestation delivered on 2020.     delivery of a viable Male infant.  Weight : pending  Apgars of 8 at 1 minute and 8 at 5 minutes.  Labor was spontaneous.  Medications administered  in labor:  Pain Rx none; Antibiotics No; Other   Perineum: Intact  Placenta-mechanism: spontaneous, intact,  with a 3 vessel cord. IV oxytocin was given.--sent to path  QBLs was 50.  Complications of pregnancy, labor and delivery: ROM > 18 hours (PPROM X 4 weeks)  Anticipated d/c date:   Birth attendants:CHET Richard CNM, MOHAMUD ROSENBERG      Delivery Summary    Nancy Shoemaker MRN# [id number]   Age: 35 year old YOB: 1985     ASSESSMENT & PLAN: routine pp cares       Labor Event Times    Labor onset date:  20    Dilation complete date:  20 Complete time:   5:30 AM   Start pushing date/time:  2020 0531      Labor Length    2nd Stage (hrs):  0 (min):  2   3rd Stage (hrs):  0 (min):  8      Labor Events     labor?:  No   steroids:  Full Course  Labor Type:  Spontaneous  Predominate monitoring during 1st stage:  continuous electronic fetal monitoring     Antibiotics received during labor?:  No     Rupture date/time: 20 0000   Rupture type:  Premature Rupture of Membranes  Fluid color:  Clear  Fluid odor:  Normal, Foul     Delivery/Placenta Date and Time    Delivery Date:  20 Delivery Time:   5:32 AM   Placenta Date/Time:  2020  5:40 AM     Vaginal Counts     Initial count performed by 2 team members:   Two Team Members   LINH Bell CNM       Needles Suture [place] Sponges Instruments    Initial counts 2  5    Added to count       Final counts 2 5     Placed during labor Accounted for at the end of labor   No NA   No NA   No NA    Final count performed by 2 team members:   Two Team Members   Arabella RN   RON Domínguez CNM      Final count correct?:  Yes     Apgars    Living status:  Living   1 Minute 5 Minute 10 Minute 15 Minute 20 Minute   Skin color: 1  1       Heart rate: 2  2       Reflex irritability: 2  2       Muscle tone: 2  2       Respiratory effort: 1  1       Total: 8  8       Apgars assigned by:  RON BAY RN     Cord    Vessels:  3 Vessels Complications:  Nuchal   Cord Blood Disposition:  Lab Gases Sent?:  No       Resuscitation    Methods:  Suctioning   Care at Delivery:  Male infant born with spontaneous cry, placed on mothers abdomen, delayed cord clamping. Stimulated, dried, placed on mothers chest, warm blankets and hat applied. At 5 minutes of age, slight retracting noted. Infant brought to warmer for further assessment. Lung sounds clear, infant with lusty cry with stimulation but occasional retracting at rest. NICU team called to assess.  Infant suctioned by NICU team, no further intervention needed. Infant brought back to mother for skin-to-skin.  ORN Bay RN 20 0645  Output in Delivery Room:  Voided     Labor Events and Shoulder Dystocia    Fetal Tracing Prior to Delivery:  Category 1  Shoulder dystocia present?:  Neg     Delivery (Maternal) (Provider to Complete) ([id number])    Episiotomy:  None  Perineal lacerations:  None       Blood Loss  Mother: Nancy Shoemaker #[id number]   Start of Mother's Information    IO Blood Loss  20 1732 - 20 0651    Delivery QBL (mL) Hospital Encounter 50 mL    Total  50 mL         End of Mother's Information  Mother: Luis M Shoemakerah #[id number]         Delivery - Provider to Complete ([id number])    Delivering clinician:  Milagros Domínguez APRN CNGUERITA  CNM Care:  Exclusive CNM care in labor  Attempted Delivery  Types (Choose all that apply):  Spontaneous Vaginal Delivery  Delivery Type (Choose the 1 that will go to the Birth History):  Vaginal, Spontaneous          Placenta    Delayed Cord Clamping:  Done  Date/Time:  6/8/2020  5:40 AM  Removal:  Spontaneous  Disposition:  Pathology     Anesthesia    Method:  None          Presentation and Position    Presentation:  Vertex  Position:  Left Occiput Anterior           CHET Richard CNM

## 2020-06-08 NOTE — PLAN OF CARE
Report taken from Andreia ALDRICH RN at bedside. Mom and baby in stable condition, will continue to monitor.

## 2020-06-08 NOTE — PLAN OF CARE
Patient doing well, denies pain and does not want tylenol or ibuprofen at this time. Bleeding is scant and is able to empty her bladder independently. Patient caring for infant and breastfeeding independently as well. Will continue to monitor.

## 2020-06-08 NOTE — PROGRESS NOTES
NICU Exam    NICU team was called to assess infant due to concern for retracting.     Upon our arrival infant was 7 minutes old under the radiant warmer. Infant had a loud lusty cry and active tone. Adequate air entry bilaterally. No work of breathing.     Recommend placing infant skin to skin to transition fully.     Lolita Rodarte APRN, CNP 6/8/2020 5:52 AM

## 2020-06-08 NOTE — PLAN OF CARE
Pt sitting on toilet feeling urge to push.CNM notified and patient walked to bedside. Pt  standing at bedside.  @ 0532 of viable baby boy. Infant vigorous. Placed skin to skin. Delayed cord clamp

## 2020-06-09 VITALS
WEIGHT: 161 LBS | RESPIRATION RATE: 16 BRPM | HEIGHT: 69 IN | SYSTOLIC BLOOD PRESSURE: 110 MMHG | TEMPERATURE: 98 F | BODY MASS INDEX: 23.85 KG/M2 | HEART RATE: 71 BPM | DIASTOLIC BLOOD PRESSURE: 68 MMHG

## 2020-06-09 LAB
ABO + RH BLD: NORMAL
ABO + RH BLD: NORMAL
BLOOD BANK CMNT PATIENT-IMP: NORMAL
DATE RH IMM GL GVN: NORMAL
FETAL CELL SCN BLD QL ROSETTE: NORMAL
HGB BLD-MCNC: 11.1 G/DL (ref 11.7–15.7)
RH IG VIALS RECOM PATIENT: NORMAL

## 2020-06-09 PROCEDURE — 36415 COLL VENOUS BLD VENIPUNCTURE: CPT | Performed by: ADVANCED PRACTICE MIDWIFE

## 2020-06-09 PROCEDURE — 86900 BLOOD TYPING SEROLOGIC ABO: CPT | Performed by: ADVANCED PRACTICE MIDWIFE

## 2020-06-09 PROCEDURE — 85018 HEMOGLOBIN: CPT | Performed by: ADVANCED PRACTICE MIDWIFE

## 2020-06-09 PROCEDURE — 25000132 ZZH RX MED GY IP 250 OP 250 PS 637: Performed by: ADVANCED PRACTICE MIDWIFE

## 2020-06-09 PROCEDURE — 25000128 H RX IP 250 OP 636: Performed by: ADVANCED PRACTICE MIDWIFE

## 2020-06-09 PROCEDURE — 86901 BLOOD TYPING SEROLOGIC RH(D): CPT | Performed by: ADVANCED PRACTICE MIDWIFE

## 2020-06-09 PROCEDURE — 85461 HEMOGLOBIN FETAL: CPT | Performed by: ADVANCED PRACTICE MIDWIFE

## 2020-06-09 RX ORDER — BREAST PUMP
1 EACH MISCELLANEOUS PRN
Qty: 1 EACH | Refills: 0 | Status: SHIPPED | OUTPATIENT
Start: 2020-06-09

## 2020-06-09 RX ADMIN — IBUPROFEN 800 MG: 800 TABLET, FILM COATED ORAL at 10:25

## 2020-06-09 RX ADMIN — HUMAN RHO(D) IMMUNE GLOBULIN 300 MCG: 300 INJECTION, SOLUTION INTRAMUSCULAR at 11:14

## 2020-06-09 NOTE — PLAN OF CARE
VSS.AFebrile. Up ad tere. VOiding and passing gas. TOlerating regular diet. C/o pain and medicated with relief. Breast feeding well and independent with baby cares. Discharge instructions reviewed and given to pt.Already has OTC meds at home. Prescription given for a breast pump. Rhogam given. Discharge to home. Awaiting baby's discharge orders and ride from . Will stay in room for now.

## 2020-06-09 NOTE — PLAN OF CARE
Patient VSS, FFU/3, with scant to light lochia. Voiding without difficulty.  Patient is independent with self cares and is ambulating without assistance.  Patient is taking Ibuprofen for pain and states the medication is controlling her pain adequately.  Plan of care reviewed with patient, understanding verbalized.  Continue with postpartum cares and assemssments.

## 2020-06-09 NOTE — DISCHARGE SUMMARY
"Post Partum Discharge Note    Nancy Shoemaker MRN# [id number]   Age: 35 year old YOB: 1985     Date of Admission:  2020  Date of Discharge::  2020  Admitting Physician:  CHET Dillard CNM  Discharge Physician:  Bess Davila CNM, MOHAMUD, MS      Home clinic: [facility]    SIGNIFICANT PROBLEMS:  Patient Active Problem List   Diagnosis      premature rupture of membranes (PPROM) with unknown onset of labor     Pregnancy with prenatal care elsewhere in third trimester     Genital herpes simplex     Supervision of high risk pregnancy in third trimester     Labor and delivery, indication for care     Indication for care in labor or delivery      (normal spontaneous vaginal delivery)      CNM Delivery Note  Labor Course: mild contx started on  at 2000, slept on and off, contx reg and stronger at 0330, arrived to hosp at 0500, urge to push to on toilet, birthed standing by the side of the bed, passed baby through legs to mom, placenta delivered in bed. Declined pit IM, then some clots and gushes at 30 min pp, started IV pit     Pain meds: none  IUP at 37 weeks gestation delivered on 2020.     delivery of a viable Male infant.  Weight : pending  Apgars of 8 at 1 minute and 8 at 5 minutes.  Labor was spontaneous.  Medications administered  in labor:  Pain Rx none; Antibiotics No; Other   Perineum: Intact  Placenta-mechanism: spontaneous, intact,  with a 3 vessel cord. IV oxytocin was given.--sent to path  QBLs was 50.  Complications of pregnancy, labor and delivery: ROM > 18 hours (PPROM X 4 weeks)  Anticipated d/c date:   Birth attendants:CHET Richard CNM, CHET,CNM    INTERVAL HISTORY:  /68   Pulse 71   Temp 98  F (36.7  C) (Oral)   Resp 16   Ht 1.753 m (5' 9\")   Wt 73 kg (161 lb)   Breastfeeding Unknown   BMI 23.78 kg/m     Pt stable, baby is rooming in. Doing well, Baby boy \"Ken\"  Breast feeding " status:initiated. Good latch and suck  Complications since 2 hours post delivery: None  Patient is tolerating activity well, Voiding without difficulty, cramping is minimal and is relieved by Ibuprofen, lochia is decreasing and patient denies clots.  Perineal pain is is minimal.  The perineum is intact    Postpartum hemoglobin   Recent Labs   Lab 06/09/20  0708 06/08/20  0500   HGB 11.1* 12.3      Blood type   Lab Results   Component Value Date    ABO O 06/09/2020    RH Neg 06/09/2020      Rubella status :immune    History of depression: denies. Postpartum depression warning signs reviewed.    ASSESSMENT:    Breasts: soft, filling  Nipples:intact, without lesion  Fundus: firm @ umbilicus-1, midline, nontender  Abdomen: soft, +bs, nttp  Lochia: small rubra, no clots,  No odor  Perineum: well approximated, healing well, no erythema, edema, bruising, hematoma or s/s of infection  Legs: trace edema, nontender    PLAN:  Normal postpartum exam , Stable Post-partum day #1  Complications:none  Home Visit Ordered- Yes    Postpartum warning s/s reviewed, including bleeding/clots, fever, mastitis, or depression, Kegels/ crunches.    Continue prenatal vitamins.  Decline OTC pp med prescription - has at home.    Birthcontrol planned: condoms and lactational amenorrhea until vasectomy    Will receive Rhogam prior to discharge.    Plan d/c home today.   2 week phone visit, 6 week in person postpartum visit.         Review of your medicines      CONTINUE these medicines which may have CHANGED, or have new prescriptions. If we are uncertain of the size of tablets/capsules you have at home, strength may be listed as something that might have changed.      Dose / Directions   * breast pump Misc  This may have changed:  Another medication with the same name was added. Make sure you understand how and when to take each.  Used for:  Disorder of lactation      Dose:  1 each  1 each as needed (lactation)  Quantity:  1 each  Refills:  0      * breast pump Misc  This may have changed:  You were already taking a medication with the same name, and this prescription was added. Make sure you understand how and when to take each.      Dose:  1 each  1 each as needed (as needed)  Quantity:  1 each  Refills:  0         * This list has 2 medication(s) that are the same as other medications prescribed for you. Read the directions carefully, and ask your doctor or other care provider to review them with you.            CONTINUE these medicines which have NOT CHANGED      Dose / Directions   prenatal multivitamin w/iron 27-0.8 MG tablet      Dose:  1 tablet  Take 1 tablet by mouth daily  Refills:  0        STOP taking    acyclovir 200 MG capsule  Commonly known as:  ZOVIRAX              Where to get your medicines      Some of these will need a paper prescription and others can be bought over the counter. Ask your nurse if you have questions.    Bring a paper prescription for each of these medications    breast pump Misc               Discharge Instructions and Follow-Up:   Discharge diet: Regular   Discharge activity: Pelvic rest: abstain from intercourse and do not use tampons for 6 week(s)   Discharge follow-up: 2 week phone visit, 6 week in person postpartum visit.               Discharge Disposition:   Discharged to home     CHET Velez, MOHAMUD

## 2020-06-15 ENCOUNTER — TELEPHONE (OUTPATIENT)
Dept: OBGYN | Facility: CLINIC | Age: 35
End: 2020-06-15

## 2020-06-15 NOTE — TELEPHONE ENCOUNTER
Left message for patient that we never received the paperwork.  Left our fax number and said to contact her HR department.      ----- Message from Tali Joseph RN sent at 6/12/2020 11:40 AM CDT -----  Regarding: FW: Short Term Disability  Contact: [phone]    ----- Message -----  From: Shanel Anaya  Sent: 6/12/2020   9:16 AM CDT  To: [facility]  Subject: Short Term Disability                             Health Call Center    Phone Message    May a detailed message be left on voicemail: yes     Reason for Call: Other: Per Patient is wanting to get a call back in regards to having Midwife fill out paper work for Short Term Disability. Please advise.     Action Taken: Message routed to:  Other: [facility]    Travel Screening: Not Applicable

## 2020-06-16 LAB — COPATH REPORT: NORMAL

## 2020-06-23 NOTE — PROGRESS NOTES
"There are no exam notes on file for this visit.   Nancy Shoemaker is a 35 year old female who is being evaluated via a billable telephone visit.      The patient has been notified of following:     \"This telephone visit will be conducted via a call between you and your physician/provider. We have found that certain health care needs can be provided without the need for a physical exam.  This service lets us provide the care you need with a short phone conversation.  If a prescription is necessary we can send it directly to your pharmacy.  If lab work is needed we can place an order for that and you can then stop by our lab to have the test done at a later time.    Telephone visits are billed at different rates depending on your insurance coverage. During this emergency period, for some insurers they may be billed the same as an in-person visit.  Please reach out to your insurance provider with any questions.    If during the course of the call the physician/provider feels a telephone visit is not appropriate, you will not be charged for this service.\"    Patient has given verbal consent for Telephone visit?  Yes    What phone number would you like to be contacted at? [phone]    How would you like to obtain your AVS? [employer]aged [employer]    Phone call duration: 10 minutes    CHET Allen CNM    - Baby boy \"Ken\" on 6/8/20, no lacs, QBL 50mL then larger gush 30 mins later, Hgb 11.1.   - Breastfeeding- feeding well, no pain. Upt to 8lbs 1oz  - Mood- \"I feel really normal, was just waiting for the baby blues to hit but haven't felt it.\"  - Bleeding- Less than menses   - Contraception- short term condom use with plans for  to have vasectomy later this year.   - Cramping minimal, perineal pain resolved   - \"Was having to remind myself to go to the bathroom, but now the feeling is coming back.\" No other urinary symptoms.      ================================================================  ROS: 10 point ROS neg other " than the symptoms noted above in the HPI.     EXAM:  There were no vitals taken for this visit.    General: alert and no distress  Psych: negative for anxiety, depression, thoughts of self-harm and thoughts of hurting someone else  Last PHQ-9 score on record= No Value exists for the : HP#PHQ9    ASSESSMENT:   Encounter Diagnosis   Name Primary?     Routine postpartum follow-up Yes      Normal postpartum exam after   Pregnancy was complicated by:  PPROM and prolonged rupture of membranes (4 weeks).      PLAN:  No orders of the defined types were placed in this encounter.     Contraception methods discussed- plans condoms, then vasectomy   Signs and symptoms of postpartum depression/anxiety discussed and aware of resources   Discussed continuing vitamins and supplements   Light exercise encouraged  RTC for PP exam in 4-6 weeks     CHET Allen, MOHAMUD

## 2020-06-23 NOTE — H&P
"ADMIT NOTE  =================  37w5d    Nancy Shoemaker is a 35 year old female with an No LMP recorded. and Estimated Date of Delivery: Data Unavailable [facility] at 5:10 am with in active labor andPPROM x ~4 wks, 2+ wks spent on ante then discharged AMA w close monitoring to await spon labor. Pt is afebrile and denies odor, green vag leaking, abd pain other than contx and baby has been active.  Very happy that labor started spon at home    HPI  ================    Contractions- strong  Fetal movement- active  ROM- yes small, clear. PPROM since 33 wks preg   Vaginal bleeding- none  GBS- negative  FOB- is involved, at bedside  Other labor support- NA    Weight gain- 171 - ? lbs, Total weight gain- ? lbs  Height- 69\"  BMI- ?  First prenatal visit at ? weeks, Total visits- ?    PROBLEM LIST  =================  Patient Active Problem List    Diagnosis Date Noted     Supervision of high risk pregnancy in third trimester 2020     Priority: High     Labor and delivery, indication for care 2020     Priority: Medium     Indication for care in labor or delivery 2020     Priority: Medium      (normal spontaneous vaginal delivery) 2020     Priority: Medium     Pregnancy with prenatal care elsewhere in third trimester 2020     Priority: Medium     2020 Reason for transfer to [facility] PPROM  Pt was receiving prenatal care from [facility]  Midwife here supporting patient: No d/t COVID    Reviewed birth expectations, has written birth preferences listed as below.  She is extremely amenable to changes based on clinical need to keep infant safe and knows that the earlier she delivers the more interventions may be recommended for  safety. She is aware of possible c/section and would like it to be as family centered as possible.     - Her  will be present  - she prefers to be allowed to labor without intervention if possible. Her last birth she just " "\"went internal\" and listened to her body.  She prefers her  to not do a lot of touching/physical support or encouragement but she welcomes verbal support from midwives as needed.  - Wants to labor and deliver in whatever position is most comfortable for her.  - Prefers limited SVE unless clinically indicated.  Does not want to know her SVE results.    - Doesn't have need for language changes, ok with \"contraction\" but open to \"surge\" and \"wave\"  - Would not like to be offered pain medication options.  Is open to suggestions about positions/water/aromtherapy but states her last labor she just got \"animal\" about it and went internal.  - Would like IA but aware with pre term and ROM>24 hours doesn't meet protocol.  Just wants most mobility possible.   - Aware no waterbirth if unable to have IA. May use hydrotherapy but undecided.  - If clinically appropriate would like to catch her own baby.  - If clinically appropriate would like delayed cord clamping until pulsations ceased.   - If clinically appropriate would like  to cut the cord.   - If clinically appropriate immediate skin to skin - uninterrupted for as long as possible but at least an hour.   - Exclusive breastfeeding  - No Vitamin K, Hep B, or Erythromycin eye ointment.  Reinforced needs to sign declination with RN team, encourage to do while still on ante.     - Will consider active management of third stage of labor given prolonged ROM, discuss again.      - For labor  -  GBS negative.   - Rh negative/received Rhogam.   - H/o HSV - on Acyclovir suppression, will need repeat bright light exam with labor.    - Will need reconfirmation of cephalic with labor.    - COVID negative 2020 - discuss re screen if sample is >72 hours before IOL.                        Genital herpes simplex 2020     Priority: Medium      premature rupture of membranes (PPROM) with unknown onset of labor 2020     Priority: Medium     2020:  " "Patient discharging to home AMA today.  Agrees to twice weekly DEMARIO visits for full vital signs and FHT's no additional surveillance per M.  Plan Weekly CBC.             HISTORIES  ============  Allergies   Allergen Reactions     Tetanus Toxoid      Past Medical History:   Diagnosis Date     Genital HSV 2010     Past Surgical History:   Procedure Laterality Date     C EACH ADD TOOTH EXTRACTION     .  Family History   Problem Relation Age of Onset     Heart Disease Father      Chronic Obstructive Pulmonary Disease Father         earlier stages     Mental Illness Mother      Social History     Tobacco Use     Smoking status: Never Smoker     Smokeless tobacco: Never Used   Substance Use Topics     Alcohol use: Not Currently     OB History    Para Term  AB Living   2 2 2 0 0 2   SAB TAB Ectopic Multiple Live Births   0 0 0 0 2      # Outcome Date GA Lbr Abe/2nd Weight Sex Delivery Anes PTL Lv   2 Term 20 37w5d 02:30 / 00:02 2.948 kg (6 lb 8 oz) M Vag-Spont None N CRISTEL      Name: FAHAD LANGLEY-ROWENA      Apgar1: 8  Apgar5: 8   1 Term 18 39w0d  2.92 kg (6 lb 7 oz) M Vag-Spont None N CRISTEL      Name: Levi      Obstetric Comments   Denies GDM, HTN, PPH.        LABS:   ===========  Prenatal Labs:  Rhogam declined   Lab Results   Component Value Date    ABO O 2020    RH Neg 2020    AS Pos (A) 2020    HGB 11.1 (L) 2020     Rubella immune  Lab Results   Component Value Date    GBS Negative 2020     Other labs:  No results found for this or any previous visit (from the past 24 hour(s)).    ROS  =========  Pt denies significant respiratory, cardiovacular, GI, or muscular/skeletalcomplaints.    See RN data base ROS.       PHYSICAL EXAM:  ===============  /68   Pulse 71   Temp 98  F (36.7  C) (Oral)   Resp 16   Ht 1.753 m (5' 9\")   Wt 73 kg (161 lb)   Breastfeeding Unknown   BMI 23.78 kg/m    General appearance: uncomfortable with contractions  GENERAL " APPEARANCE: healthy, alert and no distress  ABDOMEN:  soft, nontender, no epigastric pain  SKIN: no suspicious lesions or rashes  NEURO: Denies headache, blurred vision, other vision changes  PSYCH: mentation appears normal. and affect normal/bright  Legs: No edema     Abdomen: gravid, vertex fetus per Leopold's, non-tender between contractions.   Cephalic presentation confirmed by BSUS  EFW-  7 lbs.   CONTACTIONS: strong  FETAL HEART TONES: continuous EFM- baseline 145 with moderate variability and positive accelerations. No decelerations.  Walking around and moving into bathroom, some pressure    BLOODY SHOW: no   ROM:yes small, clear.     ASSESSMENT:  ==============  IUP @ 37w5d admitted in active labor and PPROM x 4 weeks   NST REACTIVE  Fetal Heart Rate - category one  GBS- negative    Patient Active Problem List   Diagnosis      premature rupture of membranes (PPROM) x 4 wks     Pregnancy with prenatal care elsewhere in third trimester     Genital herpes simplex     Supervision of high risk pregnancy in third trimester     Labor and delivery, indication for care     Indication for care in labor or delivery    Spon labor        PLAN:  ===========  Admit - see IP orders  Ambulation, hydration, position changes, birthing ball and tub options to facilitate labor reviewed with pt .  Anticipate   CHET Richard CNM

## 2020-06-24 ENCOUNTER — VIRTUAL VISIT (OUTPATIENT)
Dept: OBGYN | Facility: CLINIC | Age: 35
End: 2020-06-24
Attending: ADVANCED PRACTICE MIDWIFE
Payer: COMMERCIAL

## 2020-06-24 NOTE — LETTER
"2020       RE: Nancy Shoemaker  [address]  [place] [address]     Dear Colleague,    Thank you for referring your patient, Nancy Shoemaker, to the [facility]. Please see a copy of my visit note below.    There are no exam notes on file for this visit.   - Baby boy \"Ken\" on 20, no lacs, QBL 50mL then larger gush 30 mins later, Hgb 11.1.   - Breastfeeding- feeding well, no pain. Upt to 8lbs 1oz  - Mood- \"I feel really normal, was just waiting for the baby blues to hit but haven't felt it.\"  - Bleeding- Less than menses   - Contraception- short term condom use with plans for  to have vasectomy later this year.   - Cramping minimal, perineal pain resolved   - \"Was having to remind myself to go to the bathroom, but now the feeling is coming back.\" No other urinary symptoms.      ================================================================  ROS: 10 point ROS neg other than the symptoms noted above in the HPI.     EXAM:  There were no vitals taken for this visit.    General: alert and no distress  Psych: negative for anxiety, depression, thoughts of self-harm and thoughts of hurting someone else  Last PHQ-9 score on record= No Value exists for the : HP#PHQ9    ASSESSMENT:   Encounter Diagnosis   Name Primary?     Routine postpartum follow-up Yes      Normal postpartum exam after   Pregnancy was complicated by:  PPROM and prolonged rupture of membranes (4 weeks).      PLAN:  No orders of the defined types were placed in this encounter.     Contraception methods discussed- plans condoms, then vasectomy   Signs and symptoms of postpartum depression/anxiety discussed and aware of resources   Discussed continuing vitamins and supplements   Light exercise encouraged  RTC for PP exam in 4-6 weeks     CHET Allen, MOHAMUD    Again, thank you for allowing me to participate in the care of your patient.      Sincerely,    Jodie" Isaiah, APRN CNM

## 2020-06-24 NOTE — LETTER
Date:June 29, 2020      Patient was self referred, no letter generated. Do not send.        [facility]

## 2020-07-01 ENCOUNTER — TELEPHONE (OUTPATIENT)
Dept: OBGYN | Facility: CLINIC | Age: 35
End: 2020-07-01

## 2020-09-18 ENCOUNTER — TELEPHONE (OUTPATIENT)
Dept: OBGYN | Facility: CLINIC | Age: 35
End: 2020-09-18

## 2020-09-18 NOTE — TELEPHONE ENCOUNTER
Left message on voice mail to please call back to the clinic, clinic number left.  Lety Quintanilla RN

## 2020-09-18 NOTE — TELEPHONE ENCOUNTER
----- Message from Michael Dowling sent at 9/17/2020 12:58 PM CDT -----  Regarding: Prolapse questions  Per call from PT requested a call back to discuss prolapse. PT can be reached at [phone].    Thank You,    Michael

## 2021-01-03 ENCOUNTER — HEALTH MAINTENANCE LETTER (OUTPATIENT)
Age: 36
End: 2021-01-03

## 2021-06-05 ENCOUNTER — RECORDS - HEALTHEAST (OUTPATIENT)
Dept: FAMILY MEDICINE | Facility: CLINIC | Age: 36
End: 2021-06-05

## 2021-06-05 DIAGNOSIS — N63.0 LUMP OR MASS IN BREAST: ICD-10-CM

## 2021-10-10 ENCOUNTER — HEALTH MAINTENANCE LETTER (OUTPATIENT)
Age: 36
End: 2021-10-10

## 2021-10-25 NOTE — PLAN OF CARE
----- Message from Aylin Echeverria sent at 10/24/2021  2:38 PM CDT -----  Regarding: Alejandra Orellana, I am sending this to ask if you can prescribe an anti inflammatory for me, I have taken Meloxicam from [facility] prescription and it helps VSS, afebrile. Pt reports of continued scant amount of clear fluid leaking. Denies bleeding and contractions. No contractions noted on morning monitoring, FHR WDL, see flowsheets. No concerns or complaints. Stable, plan to continue to monitor and notify provider of any changes or concerns.

## 2022-01-29 ENCOUNTER — HEALTH MAINTENANCE LETTER (OUTPATIENT)
Age: 37
End: 2022-01-29

## 2022-09-18 ENCOUNTER — HEALTH MAINTENANCE LETTER (OUTPATIENT)
Age: 37
End: 2022-09-18

## 2023-05-07 ENCOUNTER — HEALTH MAINTENANCE LETTER (OUTPATIENT)
Age: 38
End: 2023-05-07

## 2024-11-25 NOTE — PLAN OF CARE
VSS, afebrile. Pt reports scant leaking of clear fluid, denies vaginal bleeding/ctx tightening or pain. TOCO quiet. FHR monitored normal baseline/+accelerations/moderate variability. Pt able to connect with her family tonight and has no questions/concerns. Continue to monitor and follow current plan of care.    no